# Patient Record
Sex: MALE | Race: ASIAN
[De-identification: names, ages, dates, MRNs, and addresses within clinical notes are randomized per-mention and may not be internally consistent; named-entity substitution may affect disease eponyms.]

---

## 2017-08-25 ENCOUNTER — APPOINTMENT (OUTPATIENT)
Dept: OTHER | Facility: CLINIC | Age: 57
End: 2017-08-25
Payer: COMMERCIAL

## 2017-08-25 ENCOUNTER — LABORATORY RESULT (OUTPATIENT)
Age: 57
End: 2017-08-25

## 2017-08-25 VITALS
DIASTOLIC BLOOD PRESSURE: 88 MMHG | HEIGHT: 68 IN | OXYGEN SATURATION: 99 % | BODY MASS INDEX: 25.76 KG/M2 | SYSTOLIC BLOOD PRESSURE: 142 MMHG | WEIGHT: 170 LBS | HEART RATE: 60 BPM

## 2017-08-25 PROCEDURE — 94010 BREATHING CAPACITY TEST: CPT

## 2017-08-25 PROCEDURE — 96150: CPT

## 2017-08-25 PROCEDURE — 99396 PREV VISIT EST AGE 40-64: CPT

## 2017-08-25 RX ORDER — METFORMIN HYDROCHLORIDE 1000 MG/1
1000 TABLET, COATED ORAL
Qty: 120 | Refills: 0 | Status: DISCONTINUED | COMMUNITY
Start: 2017-04-07 | End: 2017-08-25

## 2017-08-25 RX ORDER — LINAGLIPTIN 5 MG/1
5 TABLET, FILM COATED ORAL
Qty: 60 | Refills: 0 | Status: COMPLETED | COMMUNITY
Start: 2017-04-07

## 2017-08-28 LAB
ALBUMIN SERPL ELPH-MCNC: 4.4 G/DL
ALP BLD-CCNC: 65 U/L
ALT SERPL-CCNC: 57 U/L
ANION GAP SERPL CALC-SCNC: 16 MMOL/L
APPEARANCE: CLEAR
AST SERPL-CCNC: 34 U/L
BASOPHILS # BLD AUTO: NORMAL
BASOPHILS NFR BLD AUTO: NORMAL
BILIRUB SERPL-MCNC: 1 MG/DL
BILIRUBIN URINE: NEGATIVE
BLOOD URINE: NEGATIVE
BUN SERPL-MCNC: 21 MG/DL
CALCIUM SERPL-MCNC: 10.2 MG/DL
CHLORIDE SERPL-SCNC: 105 MMOL/L
CHOLEST SERPL-MCNC: 208 MG/DL
CHOLEST/HDLC SERPL: 3.9 RATIO
CO2 SERPL-SCNC: 22 MMOL/L
COLOR: YELLOW
CREAT SERPL-MCNC: 1.22 MG/DL
EOSINOPHIL # BLD AUTO: NORMAL
EOSINOPHIL NFR BLD AUTO: NORMAL
GLUCOSE QUALITATIVE U: NORMAL MG/DL
GLUCOSE SERPL-MCNC: 132 MG/DL
HCT VFR BLD CALC: NORMAL
HDLC SERPL-MCNC: 54 MG/DL
HGB BLD-MCNC: NORMAL
KETONES URINE: NEGATIVE
LDLC SERPL CALC-MCNC: 131 MG/DL
LEUKOCYTE ESTERASE URINE: NEGATIVE
LYMPHOCYTES # BLD AUTO: NORMAL
LYMPHOCYTES NFR BLD AUTO: NORMAL
MAN DIFF?: NORMAL
MCHC RBC-ENTMCNC: NORMAL
MCHC RBC-ENTMCNC: NORMAL
MCV RBC AUTO: NORMAL
MONOCYTES # BLD AUTO: NORMAL
MONOCYTES NFR BLD AUTO: NORMAL
NEUTROPHILS # BLD AUTO: NORMAL
NEUTROPHILS NFR BLD AUTO: NORMAL
NITRITE URINE: NEGATIVE
PH URINE: 6
PLATELET # BLD AUTO: NORMAL
POTASSIUM SERPL-SCNC: 4.8 MMOL/L
PROT SERPL-MCNC: 7.2 G/DL
PROTEIN URINE: NEGATIVE MG/DL
RBC # BLD: NORMAL
RBC # FLD: NORMAL
SODIUM SERPL-SCNC: 143 MMOL/L
SPECIFIC GRAVITY URINE: 1.02
TRIGL SERPL-MCNC: 114 MG/DL
UROBILINOGEN URINE: NORMAL MG/DL
WBC # FLD AUTO: NORMAL

## 2018-08-03 ENCOUNTER — APPOINTMENT (OUTPATIENT)
Dept: OTHER | Facility: CLINIC | Age: 58
End: 2018-08-03
Payer: COMMERCIAL

## 2018-08-03 ENCOUNTER — LABORATORY RESULT (OUTPATIENT)
Age: 58
End: 2018-08-03

## 2018-08-03 VITALS
HEIGHT: 68 IN | HEART RATE: 71 BPM | SYSTOLIC BLOOD PRESSURE: 144 MMHG | DIASTOLIC BLOOD PRESSURE: 91 MMHG | RESPIRATION RATE: 14 BRPM | OXYGEN SATURATION: 100 % | WEIGHT: 172 LBS | BODY MASS INDEX: 26.07 KG/M2

## 2018-08-03 PROCEDURE — 96150: CPT

## 2018-08-03 PROCEDURE — 94010 BREATHING CAPACITY TEST: CPT

## 2018-08-03 PROCEDURE — 99396 PREV VISIT EST AGE 40-64: CPT

## 2018-08-03 RX ORDER — SAXAGLIPTIN AND METFORMIN HYDROCHLORIDE 2.5; 1 MG/1; MG/1
2.5-1 TABLET, FILM COATED, EXTENDED RELEASE ORAL
Qty: 120 | Refills: 0 | Status: COMPLETED | COMMUNITY
Start: 2017-12-16

## 2018-08-06 LAB
ALBUMIN SERPL ELPH-MCNC: 4.9 G/DL
ALP BLD-CCNC: 67 U/L
ALT SERPL-CCNC: 58 U/L
ANION GAP SERPL CALC-SCNC: 15 MMOL/L
APPEARANCE: CLEAR
AST SERPL-CCNC: 29 U/L
BASOPHILS # BLD AUTO: 0.03 K/UL
BASOPHILS NFR BLD AUTO: 0.5 %
BILIRUB SERPL-MCNC: 0.8 MG/DL
BILIRUBIN URINE: NEGATIVE
BLOOD URINE: NEGATIVE
BUN SERPL-MCNC: 21 MG/DL
CALCIUM SERPL-MCNC: 9.5 MG/DL
CHLORIDE SERPL-SCNC: 102 MMOL/L
CHOLEST SERPL-MCNC: 177 MG/DL
CHOLEST/HDLC SERPL: 3.8 RATIO
CO2 SERPL-SCNC: 22 MMOL/L
COLOR: YELLOW
CREAT SERPL-MCNC: 1.16 MG/DL
EOSINOPHIL # BLD AUTO: 0.12 K/UL
EOSINOPHIL NFR BLD AUTO: 2 %
GLUCOSE QUALITATIVE U: NEGATIVE MG/DL
GLUCOSE SERPL-MCNC: 162 MG/DL
HCT VFR BLD CALC: 37 %
HDLC SERPL-MCNC: 47 MG/DL
HGB BLD-MCNC: 11.5 G/DL
IMM GRANULOCYTES NFR BLD AUTO: 0.3 %
KETONES URINE: NEGATIVE
LDLC SERPL CALC-MCNC: 108 MG/DL
LEUKOCYTE ESTERASE URINE: NEGATIVE
LYMPHOCYTES # BLD AUTO: 1.79 K/UL
LYMPHOCYTES NFR BLD AUTO: 29.4 %
MAN DIFF?: NORMAL
MCHC RBC-ENTMCNC: 19.3 PG
MCHC RBC-ENTMCNC: 31.1 GM/DL
MCV RBC AUTO: 62 FL
MONOCYTES # BLD AUTO: 0.25 K/UL
MONOCYTES NFR BLD AUTO: 4.1 %
NEUTROPHILS # BLD AUTO: 3.87 K/UL
NEUTROPHILS NFR BLD AUTO: 63.7 %
NITRITE URINE: NEGATIVE
PH URINE: 5.5
PLATELET # BLD AUTO: 213 K/UL
POTASSIUM SERPL-SCNC: 4.8 MMOL/L
PROT SERPL-MCNC: 7 G/DL
PROTEIN URINE: NEGATIVE MG/DL
RBC # BLD: 5.97 M/UL
RBC # FLD: 16.4 %
SODIUM SERPL-SCNC: 139 MMOL/L
SPECIFIC GRAVITY URINE: 1.01
TRIGL SERPL-MCNC: 111 MG/DL
UROBILINOGEN URINE: NEGATIVE MG/DL
WBC # FLD AUTO: 6.08 K/UL

## 2018-09-27 ENCOUNTER — APPOINTMENT (OUTPATIENT)
Dept: GASTROENTEROLOGY | Facility: CLINIC | Age: 58
End: 2018-09-27

## 2019-08-02 ENCOUNTER — LABORATORY RESULT (OUTPATIENT)
Age: 59
End: 2019-08-02

## 2019-08-02 ENCOUNTER — APPOINTMENT (OUTPATIENT)
Dept: OTHER | Facility: CLINIC | Age: 59
End: 2019-08-02
Payer: COMMERCIAL

## 2019-08-02 VITALS
HEIGHT: 68 IN | DIASTOLIC BLOOD PRESSURE: 75 MMHG | WEIGHT: 168 LBS | HEART RATE: 77 BPM | SYSTOLIC BLOOD PRESSURE: 117 MMHG | OXYGEN SATURATION: 98 % | RESPIRATION RATE: 16 BRPM | BODY MASS INDEX: 25.46 KG/M2

## 2019-08-02 PROCEDURE — 99396 PREV VISIT EST AGE 40-64: CPT | Mod: 25

## 2019-08-02 PROCEDURE — 94010 BREATHING CAPACITY TEST: CPT

## 2019-08-02 RX ORDER — SITAGLIPTIN AND METFORMIN HYDROCHLORIDE 50; 1000 MG/1; MG/1
50-1000 TABLET, FILM COATED ORAL
Qty: 180 | Refills: 0 | Status: COMPLETED | COMMUNITY
Start: 2019-01-21

## 2019-08-05 LAB
ALBUMIN SERPL ELPH-MCNC: 4.6 G/DL
ALP BLD-CCNC: 57 U/L
ALT SERPL-CCNC: 49 U/L
ANION GAP SERPL CALC-SCNC: 20 MMOL/L
APPEARANCE: CLEAR
AST SERPL-CCNC: 30 U/L
BACTERIA: NEGATIVE
BASOPHILS # BLD AUTO: 0.04 K/UL
BASOPHILS NFR BLD AUTO: 0.6 %
BILIRUB SERPL-MCNC: 1 MG/DL
BILIRUBIN URINE: NEGATIVE
BLOOD URINE: NEGATIVE
BUN SERPL-MCNC: 24 MG/DL
CALCIUM SERPL-MCNC: 9.5 MG/DL
CHLORIDE SERPL-SCNC: 106 MMOL/L
CHOLEST SERPL-MCNC: 168 MG/DL
CHOLEST/HDLC SERPL: 3.6 RATIO
CO2 SERPL-SCNC: 17 MMOL/L
COLOR: NORMAL
CREAT SERPL-MCNC: 1.54 MG/DL
EOSINOPHIL # BLD AUTO: 0.11 K/UL
EOSINOPHIL NFR BLD AUTO: 1.7 %
GLUCOSE QUALITATIVE U: NEGATIVE
GLUCOSE SERPL-MCNC: 124 MG/DL
HCT VFR BLD CALC: 36.2 %
HDLC SERPL-MCNC: 47 MG/DL
HGB BLD-MCNC: 11.1 G/DL
HYALINE CASTS: 0 /LPF
IMM GRANULOCYTES NFR BLD AUTO: 0.3 %
KETONES URINE: NEGATIVE
LDLC SERPL CALC-MCNC: 98 MG/DL
LEUKOCYTE ESTERASE URINE: NEGATIVE
LYMPHOCYTES # BLD AUTO: 1.22 K/UL
LYMPHOCYTES NFR BLD AUTO: 19.2 %
MAN DIFF?: NORMAL
MCHC RBC-ENTMCNC: 20 PG
MCHC RBC-ENTMCNC: 30.7 GM/DL
MCV RBC AUTO: 65.1 FL
MICROSCOPIC-UA: NORMAL
MONOCYTES # BLD AUTO: 0.39 K/UL
MONOCYTES NFR BLD AUTO: 6.2 %
NEUTROPHILS # BLD AUTO: 4.56 K/UL
NEUTROPHILS NFR BLD AUTO: 72 %
NITRITE URINE: NEGATIVE
PH URINE: 6
PLATELET # BLD AUTO: 217 K/UL
POTASSIUM SERPL-SCNC: 4.7 MMOL/L
PROT SERPL-MCNC: 6.9 G/DL
PROTEIN URINE: NEGATIVE
RBC # BLD: 5.56 M/UL
RBC # FLD: 18 %
RED BLOOD CELLS URINE: 5 /HPF
SODIUM SERPL-SCNC: 143 MMOL/L
SPECIFIC GRAVITY URINE: 1.01
SQUAMOUS EPITHELIAL CELLS: 0 /HPF
TRIGL SERPL-MCNC: 114 MG/DL
UROBILINOGEN URINE: NORMAL
WBC # FLD AUTO: 6.34 K/UL
WHITE BLOOD CELLS URINE: 1 /HPF

## 2020-07-31 ENCOUNTER — APPOINTMENT (OUTPATIENT)
Dept: OTHER | Facility: CLINIC | Age: 60
End: 2020-07-31
Payer: COMMERCIAL

## 2020-07-31 PROCEDURE — 99396 PREV VISIT EST AGE 40-64: CPT | Mod: 95

## 2021-09-17 ENCOUNTER — APPOINTMENT (OUTPATIENT)
Dept: OTHER | Facility: CLINIC | Age: 61
End: 2021-09-17
Payer: COMMERCIAL

## 2021-09-17 ENCOUNTER — LABORATORY RESULT (OUTPATIENT)
Age: 61
End: 2021-09-17

## 2021-09-17 VITALS
DIASTOLIC BLOOD PRESSURE: 86 MMHG | HEIGHT: 68 IN | WEIGHT: 165 LBS | SYSTOLIC BLOOD PRESSURE: 147 MMHG | TEMPERATURE: 98.6 F | BODY MASS INDEX: 25.01 KG/M2 | RESPIRATION RATE: 15 BRPM | HEART RATE: 66 BPM | OXYGEN SATURATION: 98 %

## 2021-09-17 DIAGNOSIS — Z23 ENCOUNTER FOR IMMUNIZATION: ICD-10-CM

## 2021-09-17 PROCEDURE — 90686 IIV4 VACC NO PRSV 0.5 ML IM: CPT

## 2021-09-17 PROCEDURE — 99396 PREV VISIT EST AGE 40-64: CPT | Mod: 25

## 2021-09-17 PROCEDURE — G0008: CPT

## 2021-09-17 RX ORDER — AMLODIPINE BESYLATE 10 MG/1
10 TABLET ORAL
Qty: 90 | Refills: 0 | Status: COMPLETED | COMMUNITY
Start: 2018-05-31 | End: 2021-09-17

## 2021-09-21 LAB
ALBUMIN SERPL ELPH-MCNC: 4.3 G/DL
ALP BLD-CCNC: 70 U/L
ALT SERPL-CCNC: 42 U/L
ANION GAP SERPL CALC-SCNC: 15 MMOL/L
APPEARANCE: CLEAR
AST SERPL-CCNC: 23 U/L
BACTERIA: NEGATIVE
BASOPHILS # BLD AUTO: 0.11 K/UL
BASOPHILS NFR BLD AUTO: 1.7 %
BILIRUB SERPL-MCNC: 0.6 MG/DL
BILIRUBIN URINE: NEGATIVE
BLOOD URINE: NEGATIVE
BUN SERPL-MCNC: 23 MG/DL
CALCIUM SERPL-MCNC: 10 MG/DL
CHLORIDE SERPL-SCNC: 102 MMOL/L
CHOLEST SERPL-MCNC: 181 MG/DL
CO2 SERPL-SCNC: 22 MMOL/L
COLOR: NORMAL
CREAT SERPL-MCNC: 1.03 MG/DL
EOSINOPHIL # BLD AUTO: 0.16 K/UL
EOSINOPHIL NFR BLD AUTO: 2.6 %
GLUCOSE QUALITATIVE U: ABNORMAL
GLUCOSE SERPL-MCNC: 236 MG/DL
HCT VFR BLD CALC: 42.4 %
HDLC SERPL-MCNC: 43 MG/DL
HGB BLD-MCNC: 12.4 G/DL
HYALINE CASTS: 1 /LPF
KETONES URINE: NEGATIVE
LDLC SERPL CALC-MCNC: 99 MG/DL
LEUKOCYTE ESTERASE URINE: NEGATIVE
LYMPHOCYTES # BLD AUTO: 1.86 K/UL
LYMPHOCYTES NFR BLD AUTO: 29.9 %
MAN DIFF?: NORMAL
MCHC RBC-ENTMCNC: 19.9 PG
MCHC RBC-ENTMCNC: 29.2 GM/DL
MCV RBC AUTO: 68.1 FL
MICROSCOPIC-UA: NORMAL
MONOCYTES # BLD AUTO: 0.21 K/UL
MONOCYTES NFR BLD AUTO: 3.4 %
NEUTROPHILS # BLD AUTO: 3.83 K/UL
NEUTROPHILS NFR BLD AUTO: 60.7 %
NITRITE URINE: NEGATIVE
NONHDLC SERPL-MCNC: 138 MG/DL
PH URINE: 6
PLATELET # BLD AUTO: 221 K/UL
POTASSIUM SERPL-SCNC: 4.9 MMOL/L
PROT SERPL-MCNC: 7 G/DL
PROTEIN URINE: NORMAL
RBC # BLD: 6.23 M/UL
RBC # FLD: 18.6 %
RED BLOOD CELLS URINE: 7 /HPF
SODIUM SERPL-SCNC: 139 MMOL/L
SPECIFIC GRAVITY URINE: 1.02
SQUAMOUS EPITHELIAL CELLS: 0 /HPF
TRIGL SERPL-MCNC: 197 MG/DL
UROBILINOGEN URINE: NORMAL
WBC # FLD AUTO: 6.22 K/UL
WHITE BLOOD CELLS URINE: 1 /HPF

## 2022-10-06 ENCOUNTER — APPOINTMENT (OUTPATIENT)
Dept: OTHER | Facility: CLINIC | Age: 62
End: 2022-10-06

## 2022-10-06 ENCOUNTER — LABORATORY RESULT (OUTPATIENT)
Age: 62
End: 2022-10-06

## 2022-10-06 VITALS
DIASTOLIC BLOOD PRESSURE: 80 MMHG | TEMPERATURE: 97.4 F | WEIGHT: 170 LBS | HEIGHT: 68 IN | BODY MASS INDEX: 25.76 KG/M2 | SYSTOLIC BLOOD PRESSURE: 121 MMHG | OXYGEN SATURATION: 98 % | HEART RATE: 65 BPM

## 2022-10-06 DIAGNOSIS — Z12.9 ENCOUNTER FOR SCREENING FOR MALIGNANT NEOPLASM, SITE UNSPECIFIED: ICD-10-CM

## 2022-10-06 DIAGNOSIS — Z04.9 ENCOUNTER FOR EXAMINATION AND OBSERVATION FOR UNSPECIFIED REASON: ICD-10-CM

## 2022-10-06 PROCEDURE — 94010 BREATHING CAPACITY TEST: CPT

## 2022-10-06 PROCEDURE — G0008: CPT

## 2022-10-06 PROCEDURE — 99396 PREV VISIT EST AGE 40-64: CPT | Mod: 25

## 2022-10-06 PROCEDURE — 90686 IIV4 VACC NO PRSV 0.5 ML IM: CPT

## 2022-10-10 LAB
ALBUMIN SERPL ELPH-MCNC: 4.8 G/DL
ALP BLD-CCNC: 70 U/L
ALT SERPL-CCNC: 47 U/L
ANION GAP SERPL CALC-SCNC: 18 MMOL/L
APPEARANCE: CLEAR
AST SERPL-CCNC: 32 U/L
BACTERIA: NEGATIVE
BASOPHILS # BLD AUTO: 0.06 K/UL
BASOPHILS NFR BLD AUTO: 0.9 %
BILIRUB SERPL-MCNC: 0.9 MG/DL
BILIRUBIN URINE: NEGATIVE
BLOOD URINE: NEGATIVE
BUN SERPL-MCNC: 33 MG/DL
CALCIUM SERPL-MCNC: 10 MG/DL
CHLORIDE SERPL-SCNC: 98 MMOL/L
CHOLEST SERPL-MCNC: 168 MG/DL
CO2 SERPL-SCNC: 22 MMOL/L
COLOR: NORMAL
CREAT SERPL-MCNC: 1.38 MG/DL
EGFR: 59 ML/MIN/1.73M2
EOSINOPHIL # BLD AUTO: 0.11 K/UL
EOSINOPHIL NFR BLD AUTO: 1.7 %
GLUCOSE QUALITATIVE U: ABNORMAL
GLUCOSE SERPL-MCNC: 278 MG/DL
HCT VFR BLD CALC: 45 %
HDLC SERPL-MCNC: 38 MG/DL
HGB BLD-MCNC: 13.2 G/DL
HYALINE CASTS: 3 /LPF
IMM GRANULOCYTES NFR BLD AUTO: 0.2 %
KETONES URINE: NEGATIVE
LDLC SERPL CALC-MCNC: 93 MG/DL
LEUKOCYTE ESTERASE URINE: NEGATIVE
LYMPHOCYTES # BLD AUTO: 1.45 K/UL
LYMPHOCYTES NFR BLD AUTO: 22.6 %
MAN DIFF?: NORMAL
MCHC RBC-ENTMCNC: 19.6 PG
MCHC RBC-ENTMCNC: 29.3 GM/DL
MCV RBC AUTO: 67 FL
MICROSCOPIC-UA: NORMAL
MONOCYTES # BLD AUTO: 0.27 K/UL
MONOCYTES NFR BLD AUTO: 4.2 %
NEUTROPHILS # BLD AUTO: 4.53 K/UL
NEUTROPHILS NFR BLD AUTO: 70.4 %
NITRITE URINE: NEGATIVE
NONHDLC SERPL-MCNC: 130 MG/DL
PH URINE: 5.5
PLATELET # BLD AUTO: 219 K/UL
POTASSIUM SERPL-SCNC: 4.4 MMOL/L
PROT SERPL-MCNC: 7.3 G/DL
PROTEIN URINE: NEGATIVE
RBC # BLD: 6.72 M/UL
RBC # FLD: 19.7 %
RED BLOOD CELLS URINE: 3 /HPF
SODIUM SERPL-SCNC: 138 MMOL/L
SPECIFIC GRAVITY URINE: 1.02
SQUAMOUS EPITHELIAL CELLS: 0 /HPF
TRIGL SERPL-MCNC: 187 MG/DL
UROBILINOGEN URINE: NORMAL
WBC # FLD AUTO: 6.43 K/UL
WHITE BLOOD CELLS URINE: 1 /HPF

## 2022-10-28 ENCOUNTER — APPOINTMENT (OUTPATIENT)
Dept: UROLOGY | Facility: CLINIC | Age: 62
End: 2022-10-28

## 2022-10-28 VITALS
TEMPERATURE: 97.4 F | HEART RATE: 59 BPM | DIASTOLIC BLOOD PRESSURE: 77 MMHG | OXYGEN SATURATION: 99 % | WEIGHT: 165 LBS | SYSTOLIC BLOOD PRESSURE: 120 MMHG | HEIGHT: 68 IN | BODY MASS INDEX: 25.01 KG/M2

## 2022-10-28 DIAGNOSIS — R39.13 SPLITTING OF URINARY STREAM: ICD-10-CM

## 2022-10-28 DIAGNOSIS — Z78.9 OTHER SPECIFIED HEALTH STATUS: ICD-10-CM

## 2022-10-28 PROCEDURE — 99204 OFFICE O/P NEW MOD 45 MIN: CPT

## 2022-10-28 NOTE — ASSESSMENT
[FreeTextEntry1] : Mr. Duran is a very pleasant 62 year old man here today for microscopic hematuria.\par Urinalysis most recently demonstrates 3 red blood cells per high-powered field, however demonstrated up to 7 red blood cells per high-powered field in September 2021\par PSA from 07/2022 0.4.\par UC today\par CT Urogram.\par RTO cystoscopy 3 weeks.\par -We discussed AUA guidelines regarding risk stratification for microscopic hematuria\par -Extensive discussion of the potential etiologies of hematuria, as well as the need to complete full work up for evaluation of cancer or other  sources of hematuria.  Patient understands and wishes to proceed.

## 2022-10-28 NOTE — HISTORY OF PRESENT ILLNESS
[Currently Experiencing ___] :  [unfilled] [Intermittency] : intermittency [Hematuria - Microscopic] : microscopic hematuria [None] : None [FreeTextEntry1] : Mr. Duran is a very pleasant 62 year old man here today for microscopic hematuria.\par He was referred by Dr. Melanie Reyes.\par He has a history of microscopic hematuria, UA from 10/06/22 3 RBCs, previous in 2021 7 RBCs.\par Previous in 2019 5 RBCs.\par He denies any gross hematuria, dysuria or urinary retention.\par Reports occasional back pain upon waking.\par Reports intermittent episodes of interrupted stream that is not so bothersome.\par Nocturia x1 that sometimes interrupts sleep habits.\par Denies any personal or family history of kidney stones.\par Denies any family history of urological cancers.\par Former smoker, quit over 40 years ago.

## 2022-10-31 LAB — BACTERIA UR CULT: NORMAL

## 2022-11-22 ENCOUNTER — APPOINTMENT (OUTPATIENT)
Dept: UROLOGY | Facility: CLINIC | Age: 62
End: 2022-11-22

## 2022-12-21 ENCOUNTER — APPOINTMENT (OUTPATIENT)
Dept: UROLOGY | Facility: CLINIC | Age: 62
End: 2022-12-21

## 2022-12-21 VITALS
HEIGHT: 68 IN | DIASTOLIC BLOOD PRESSURE: 80 MMHG | OXYGEN SATURATION: 98 % | TEMPERATURE: 98 F | HEART RATE: 72 BPM | BODY MASS INDEX: 25.16 KG/M2 | WEIGHT: 166 LBS | SYSTOLIC BLOOD PRESSURE: 127 MMHG

## 2022-12-21 DIAGNOSIS — R31.29 OTHER MICROSCOPIC HEMATURIA: ICD-10-CM

## 2022-12-21 PROCEDURE — 99213 OFFICE O/P EST LOW 20 MIN: CPT | Mod: 25

## 2022-12-21 PROCEDURE — 52000 CYSTOURETHROSCOPY: CPT

## 2022-12-21 NOTE — HISTORY OF PRESENT ILLNESS
[FreeTextEntry1] : Very pleasant 62-year-old gentleman who presents for follow-up of microscopic hematuria.  He was recently found to have 3 red blood cells per high-powered field on urinalysis from 10/2022.  This was noted to be as high as 7 in 2021.  He denies a history of gross hematuria.  No flank pain or suprapubic pain.  He underwent a CT scan at [facility] in November 2022 which demonstrated no kidney stones, hydronephrosis, or renal masses.  He underwent a cystoscopy today which demonstrated no urothelial lesions.

## 2022-12-21 NOTE — ASSESSMENT
[FreeTextEntry1] : Very pleasant 62-year-old gentleman who presents for follow-up of microscopic hematuria\par -CT images from [facility] radiology reviewed demonstrating no kidney stones, hydronephrosis, renal masses\par -Cystoscopy today negative\par -PSA from 7/2022 was 0.4\par -Creatinine 1.38 from 10/2022\par -Urinalysis most recently demonstrates 3 red blood cells per high-powered field\par -Follow-up in 6 months with urine studies

## 2022-12-26 ENCOUNTER — FORM ENCOUNTER (OUTPATIENT)
Age: 62
End: 2022-12-26

## 2022-12-27 ENCOUNTER — INPATIENT (INPATIENT)
Facility: HOSPITAL | Age: 62
LOS: 3 days | Discharge: HOME | End: 2022-12-31
Attending: HOSPITALIST | Admitting: HOSPITALIST
Payer: COMMERCIAL

## 2022-12-27 VITALS
RESPIRATION RATE: 16 BRPM | OXYGEN SATURATION: 100 % | HEART RATE: 99 BPM | SYSTOLIC BLOOD PRESSURE: 81 MMHG | TEMPERATURE: 100 F | DIASTOLIC BLOOD PRESSURE: 59 MMHG

## 2022-12-27 DIAGNOSIS — I10 ESSENTIAL (PRIMARY) HYPERTENSION: ICD-10-CM

## 2022-12-27 DIAGNOSIS — M10.9 GOUT, UNSPECIFIED: ICD-10-CM

## 2022-12-27 DIAGNOSIS — D64.9 ANEMIA, UNSPECIFIED: ICD-10-CM

## 2022-12-27 DIAGNOSIS — N39.0 URINARY TRACT INFECTION, SITE NOT SPECIFIED: ICD-10-CM

## 2022-12-27 DIAGNOSIS — E11.10 TYPE 2 DIABETES MELLITUS WITH KETOACIDOSIS WITHOUT COMA: ICD-10-CM

## 2022-12-27 DIAGNOSIS — E11.65 TYPE 2 DIABETES MELLITUS WITH HYPERGLYCEMIA: ICD-10-CM

## 2022-12-27 DIAGNOSIS — A41.9 SEPSIS, UNSPECIFIED ORGANISM: ICD-10-CM

## 2022-12-27 DIAGNOSIS — Z29.9 ENCOUNTER FOR PROPHYLACTIC MEASURES, UNSPECIFIED: ICD-10-CM

## 2022-12-27 DIAGNOSIS — D69.6 THROMBOCYTOPENIA, UNSPECIFIED: ICD-10-CM

## 2022-12-27 DIAGNOSIS — E78.5 HYPERLIPIDEMIA, UNSPECIFIED: ICD-10-CM

## 2022-12-27 DIAGNOSIS — E87.1 HYPO-OSMOLALITY AND HYPONATREMIA: ICD-10-CM

## 2022-12-27 DIAGNOSIS — N17.9 ACUTE KIDNEY FAILURE, UNSPECIFIED: ICD-10-CM

## 2022-12-27 DIAGNOSIS — E11.9 TYPE 2 DIABETES MELLITUS WITHOUT COMPLICATIONS: ICD-10-CM

## 2022-12-27 LAB
ALBUMIN SERPL ELPH-MCNC: 2.9 G/DL — LOW (ref 3.3–5)
ALP SERPL-CCNC: 90 U/L — SIGNIFICANT CHANGE UP (ref 40–120)
ALT FLD-CCNC: 19 U/L — SIGNIFICANT CHANGE UP (ref 4–41)
ANION GAP SERPL CALC-SCNC: 12 MMOL/L — SIGNIFICANT CHANGE UP (ref 7–14)
ANION GAP SERPL CALC-SCNC: 15 MMOL/L — HIGH (ref 7–14)
ANION GAP SERPL CALC-SCNC: 16 MMOL/L — HIGH (ref 7–14)
ANION GAP SERPL CALC-SCNC: 18 MMOL/L — HIGH (ref 7–14)
ANISOCYTOSIS BLD QL: SIGNIFICANT CHANGE UP
APPEARANCE UR: CLEAR — SIGNIFICANT CHANGE UP
APTT BLD: 28.9 SEC — SIGNIFICANT CHANGE UP (ref 27–36.3)
AST SERPL-CCNC: 19 U/L — SIGNIFICANT CHANGE UP (ref 4–40)
B PERT DNA SPEC QL NAA+PROBE: SIGNIFICANT CHANGE UP
B PERT+PARAPERT DNA PNL SPEC NAA+PROBE: SIGNIFICANT CHANGE UP
B-OH-BUTYR SERPL-SCNC: 1.6 MMOL/L — HIGH (ref 0–0.4)
B-OH-BUTYR SERPL-SCNC: 2.2 MMOL/L — HIGH (ref 0–0.4)
BACTERIA # UR AUTO: ABNORMAL
BASE EXCESS BLDV CALC-SCNC: -2.5 MMOL/L — LOW (ref -2–3)
BASE EXCESS BLDV CALC-SCNC: -6.7 MMOL/L — LOW (ref -2–3)
BASOPHILS # BLD AUTO: 0 K/UL — SIGNIFICANT CHANGE UP (ref 0–0.2)
BASOPHILS NFR BLD AUTO: 0 % — SIGNIFICANT CHANGE UP (ref 0–2)
BILIRUB SERPL-MCNC: 1.1 MG/DL — SIGNIFICANT CHANGE UP (ref 0.2–1.2)
BILIRUB UR-MCNC: NEGATIVE — SIGNIFICANT CHANGE UP
BLOOD GAS VENOUS COMPREHENSIVE RESULT: SIGNIFICANT CHANGE UP
BLOOD GAS VENOUS COMPREHENSIVE RESULT: SIGNIFICANT CHANGE UP
BORDETELLA PARAPERTUSSIS (RAPRVP): SIGNIFICANT CHANGE UP
BUN SERPL-MCNC: 32 MG/DL — HIGH (ref 7–23)
BUN SERPL-MCNC: 34 MG/DL — HIGH (ref 7–23)
BUN SERPL-MCNC: 41 MG/DL — HIGH (ref 7–23)
BUN SERPL-MCNC: 46 MG/DL — HIGH (ref 7–23)
C PNEUM DNA SPEC QL NAA+PROBE: SIGNIFICANT CHANGE UP
CALCIUM SERPL-MCNC: 8.1 MG/DL — LOW (ref 8.4–10.5)
CALCIUM SERPL-MCNC: 8.1 MG/DL — LOW (ref 8.4–10.5)
CALCIUM SERPL-MCNC: 8.5 MG/DL — SIGNIFICANT CHANGE UP (ref 8.4–10.5)
CALCIUM SERPL-MCNC: 8.6 MG/DL — SIGNIFICANT CHANGE UP (ref 8.4–10.5)
CHLORIDE BLDV-SCNC: 90 MMOL/L — LOW (ref 96–108)
CHLORIDE BLDV-SCNC: 99 MMOL/L — SIGNIFICANT CHANGE UP (ref 96–108)
CHLORIDE SERPL-SCNC: 87 MMOL/L — LOW (ref 98–107)
CHLORIDE SERPL-SCNC: 93 MMOL/L — LOW (ref 98–107)
CHLORIDE SERPL-SCNC: 96 MMOL/L — LOW (ref 98–107)
CHLORIDE SERPL-SCNC: 98 MMOL/L — SIGNIFICANT CHANGE UP (ref 98–107)
CO2 BLDV-SCNC: 19.3 MMOL/L — LOW (ref 22–26)
CO2 BLDV-SCNC: 22.4 MMOL/L — SIGNIFICANT CHANGE UP (ref 22–26)
CO2 SERPL-SCNC: 17 MMOL/L — LOW (ref 22–31)
CO2 SERPL-SCNC: 18 MMOL/L — LOW (ref 22–31)
CO2 SERPL-SCNC: 19 MMOL/L — LOW (ref 22–31)
CO2 SERPL-SCNC: 21 MMOL/L — LOW (ref 22–31)
COLOR SPEC: SIGNIFICANT CHANGE UP
CREAT SERPL-MCNC: 1.5 MG/DL — HIGH (ref 0.5–1.3)
CREAT SERPL-MCNC: 1.6 MG/DL — HIGH (ref 0.5–1.3)
CREAT SERPL-MCNC: 1.95 MG/DL — HIGH (ref 0.5–1.3)
CREAT SERPL-MCNC: 2.16 MG/DL — HIGH (ref 0.5–1.3)
DACRYOCYTES BLD QL SMEAR: SLIGHT — SIGNIFICANT CHANGE UP
DIFF PNL FLD: ABNORMAL
EGFR: 34 ML/MIN/1.73M2 — LOW
EGFR: 39 ML/MIN/1.73M2 — LOW
EGFR: 49 ML/MIN/1.73M2 — LOW
EGFR: 53 ML/MIN/1.73M2 — LOW
ELLIPTOCYTES BLD QL SMEAR: SLIGHT — SIGNIFICANT CHANGE UP
EOSINOPHIL # BLD AUTO: 0 K/UL — SIGNIFICANT CHANGE UP (ref 0–0.5)
EOSINOPHIL NFR BLD AUTO: 0 % — SIGNIFICANT CHANGE UP (ref 0–6)
EPI CELLS # UR: 2 /HPF — SIGNIFICANT CHANGE UP (ref 0–5)
FLUAV SUBTYP SPEC NAA+PROBE: SIGNIFICANT CHANGE UP
FLUBV RNA SPEC QL NAA+PROBE: SIGNIFICANT CHANGE UP
GAS PNL BLDV: 120 MMOL/L — CRITICAL LOW (ref 136–145)
GAS PNL BLDV: 128 MMOL/L — LOW (ref 136–145)
GAS PNL BLDV: SIGNIFICANT CHANGE UP
GIANT PLATELETS BLD QL SMEAR: PRESENT — SIGNIFICANT CHANGE UP
GLUCOSE BLDC GLUCOMTR-MCNC: 214 MG/DL — HIGH (ref 70–99)
GLUCOSE BLDC GLUCOMTR-MCNC: 249 MG/DL — HIGH (ref 70–99)
GLUCOSE BLDC GLUCOMTR-MCNC: 275 MG/DL — HIGH (ref 70–99)
GLUCOSE BLDC GLUCOMTR-MCNC: 276 MG/DL — HIGH (ref 70–99)
GLUCOSE BLDC GLUCOMTR-MCNC: 284 MG/DL — HIGH (ref 70–99)
GLUCOSE BLDC GLUCOMTR-MCNC: 286 MG/DL — HIGH (ref 70–99)
GLUCOSE BLDV-MCNC: 217 MG/DL — HIGH (ref 70–99)
GLUCOSE BLDV-MCNC: 375 MG/DL — HIGH (ref 70–99)
GLUCOSE SERPL-MCNC: 218 MG/DL — HIGH (ref 70–99)
GLUCOSE SERPL-MCNC: 280 MG/DL — HIGH (ref 70–99)
GLUCOSE SERPL-MCNC: 320 MG/DL — HIGH (ref 70–99)
GLUCOSE SERPL-MCNC: 351 MG/DL — HIGH (ref 70–99)
GLUCOSE UR QL: ABNORMAL
HADV DNA SPEC QL NAA+PROBE: SIGNIFICANT CHANGE UP
HCO3 BLDV-SCNC: 18 MMOL/L — LOW (ref 22–29)
HCO3 BLDV-SCNC: 21 MMOL/L — LOW (ref 22–29)
HCOV 229E RNA SPEC QL NAA+PROBE: SIGNIFICANT CHANGE UP
HCOV HKU1 RNA SPEC QL NAA+PROBE: SIGNIFICANT CHANGE UP
HCOV NL63 RNA SPEC QL NAA+PROBE: SIGNIFICANT CHANGE UP
HCOV OC43 RNA SPEC QL NAA+PROBE: SIGNIFICANT CHANGE UP
HCT VFR BLD CALC: 32.5 % — LOW (ref 39–50)
HCT VFR BLDA CALC: 32 % — LOW (ref 39–51)
HCT VFR BLDA CALC: 32 % — LOW (ref 39–51)
HGB BLD CALC-MCNC: 10.6 G/DL — LOW (ref 13–17)
HGB BLD CALC-MCNC: 10.7 G/DL — LOW (ref 13–17)
HGB BLD-MCNC: 10.2 G/DL — LOW (ref 13–17)
HMPV RNA SPEC QL NAA+PROBE: SIGNIFICANT CHANGE UP
HPIV1 RNA SPEC QL NAA+PROBE: SIGNIFICANT CHANGE UP
HPIV2 RNA SPEC QL NAA+PROBE: SIGNIFICANT CHANGE UP
HPIV3 RNA SPEC QL NAA+PROBE: SIGNIFICANT CHANGE UP
HPIV4 RNA SPEC QL NAA+PROBE: SIGNIFICANT CHANGE UP
HYALINE CASTS # UR AUTO: 1 /LPF — SIGNIFICANT CHANGE UP (ref 0–7)
HYPOCHROMIA BLD QL: SLIGHT — SIGNIFICANT CHANGE UP
IANC: 5.25 K/UL — SIGNIFICANT CHANGE UP (ref 1.8–7.4)
INR BLD: 1.09 RATIO — SIGNIFICANT CHANGE UP (ref 0.88–1.16)
KETONES UR-MCNC: ABNORMAL
LACTATE BLDV-MCNC: 1.3 MMOL/L — SIGNIFICANT CHANGE UP (ref 0.5–2)
LACTATE BLDV-MCNC: 2.7 MMOL/L — HIGH (ref 0.5–2)
LEUKOCYTE ESTERASE UR-ACNC: ABNORMAL
LYMPHOCYTES # BLD AUTO: 0 % — LOW (ref 13–44)
LYMPHOCYTES # BLD AUTO: 0 K/UL — LOW (ref 1–3.3)
M PNEUMO DNA SPEC QL NAA+PROBE: SIGNIFICANT CHANGE UP
MAGNESIUM SERPL-MCNC: 1.4 MG/DL — LOW (ref 1.6–2.6)
MAGNESIUM SERPL-MCNC: 1.8 MG/DL — SIGNIFICANT CHANGE UP (ref 1.6–2.6)
MAGNESIUM SERPL-MCNC: 1.8 MG/DL — SIGNIFICANT CHANGE UP (ref 1.6–2.6)
MANUAL SMEAR VERIFICATION: SIGNIFICANT CHANGE UP
MCHC RBC-ENTMCNC: 19 PG — LOW (ref 27–34)
MCHC RBC-ENTMCNC: 31.4 GM/DL — LOW (ref 32–36)
MCV RBC AUTO: 60.4 FL — LOW (ref 80–100)
MICROCYTES BLD QL: SIGNIFICANT CHANGE UP
MONOCYTES # BLD AUTO: 0.1 K/UL — SIGNIFICANT CHANGE UP (ref 0–0.9)
MONOCYTES NFR BLD AUTO: 1.7 % — LOW (ref 2–14)
NEUTROPHILS # BLD AUTO: 5.52 K/UL — SIGNIFICANT CHANGE UP (ref 1.8–7.4)
NEUTROPHILS NFR BLD AUTO: 87 % — HIGH (ref 43–77)
NEUTS BAND # BLD: 10.4 % — CRITICAL HIGH (ref 0–6)
NITRITE UR-MCNC: NEGATIVE — SIGNIFICANT CHANGE UP
OSMOLALITY SERPL: 287 MOSM/KG — SIGNIFICANT CHANGE UP (ref 275–295)
OVALOCYTES BLD QL SMEAR: SLIGHT — SIGNIFICANT CHANGE UP
PCO2 BLDV: 33 MMHG — LOW (ref 42–55)
PCO2 BLDV: 34 MMHG — LOW (ref 42–55)
PH BLDV: 7.34 — SIGNIFICANT CHANGE UP (ref 7.32–7.43)
PH BLDV: 7.42 — SIGNIFICANT CHANGE UP (ref 7.32–7.43)
PH UR: 6 — SIGNIFICANT CHANGE UP (ref 5–8)
PHOSPHATE SERPL-MCNC: 1.6 MG/DL — LOW (ref 2.5–4.5)
PHOSPHATE SERPL-MCNC: 2.1 MG/DL — LOW (ref 2.5–4.5)
PHOSPHATE SERPL-MCNC: 2.4 MG/DL — LOW (ref 2.5–4.5)
PLAT MORPH BLD: NORMAL — SIGNIFICANT CHANGE UP
PLATELET # BLD AUTO: 133 K/UL — LOW (ref 150–400)
PLATELET COUNT - ESTIMATE: ABNORMAL
PO2 BLDV: 45 MMHG — SIGNIFICANT CHANGE UP
PO2 BLDV: 60 MMHG — SIGNIFICANT CHANGE UP
POIKILOCYTOSIS BLD QL AUTO: SIGNIFICANT CHANGE UP
POTASSIUM BLDV-SCNC: 3.6 MMOL/L — SIGNIFICANT CHANGE UP (ref 3.5–5.1)
POTASSIUM BLDV-SCNC: 4.1 MMOL/L — SIGNIFICANT CHANGE UP (ref 3.5–5.1)
POTASSIUM SERPL-MCNC: 3.6 MMOL/L — SIGNIFICANT CHANGE UP (ref 3.5–5.3)
POTASSIUM SERPL-MCNC: 3.8 MMOL/L — SIGNIFICANT CHANGE UP (ref 3.5–5.3)
POTASSIUM SERPL-MCNC: 4.1 MMOL/L — SIGNIFICANT CHANGE UP (ref 3.5–5.3)
POTASSIUM SERPL-MCNC: 4.1 MMOL/L — SIGNIFICANT CHANGE UP (ref 3.5–5.3)
POTASSIUM SERPL-SCNC: 3.6 MMOL/L — SIGNIFICANT CHANGE UP (ref 3.5–5.3)
POTASSIUM SERPL-SCNC: 3.8 MMOL/L — SIGNIFICANT CHANGE UP (ref 3.5–5.3)
POTASSIUM SERPL-SCNC: 4.1 MMOL/L — SIGNIFICANT CHANGE UP (ref 3.5–5.3)
POTASSIUM SERPL-SCNC: 4.1 MMOL/L — SIGNIFICANT CHANGE UP (ref 3.5–5.3)
PROT SERPL-MCNC: 6 G/DL — SIGNIFICANT CHANGE UP (ref 6–8.3)
PROT UR-MCNC: ABNORMAL
PROTHROM AB SERPL-ACNC: 12.7 SEC — SIGNIFICANT CHANGE UP (ref 10.5–13.4)
RAPID RVP RESULT: SIGNIFICANT CHANGE UP
RBC # BLD: 5.38 M/UL — SIGNIFICANT CHANGE UP (ref 4.2–5.8)
RBC # FLD: 15.8 % — HIGH (ref 10.3–14.5)
RBC BLD AUTO: ABNORMAL
RBC CASTS # UR COMP ASSIST: 3 /HPF — SIGNIFICANT CHANGE UP (ref 0–4)
RSV RNA SPEC QL NAA+PROBE: SIGNIFICANT CHANGE UP
RV+EV RNA SPEC QL NAA+PROBE: SIGNIFICANT CHANGE UP
SAO2 % BLDV: 70.3 % — SIGNIFICANT CHANGE UP
SAO2 % BLDV: 87.1 % — SIGNIFICANT CHANGE UP
SARS-COV-2 RNA SPEC QL NAA+PROBE: SIGNIFICANT CHANGE UP
SCHISTOCYTES BLD QL AUTO: SLIGHT — SIGNIFICANT CHANGE UP
SODIUM SERPL-SCNC: 122 MMOL/L — LOW (ref 135–145)
SODIUM SERPL-SCNC: 127 MMOL/L — LOW (ref 135–145)
SODIUM SERPL-SCNC: 129 MMOL/L — LOW (ref 135–145)
SODIUM SERPL-SCNC: 132 MMOL/L — LOW (ref 135–145)
SP GR SPEC: 1.01 — SIGNIFICANT CHANGE UP (ref 1.01–1.05)
UROBILINOGEN FLD QL: SIGNIFICANT CHANGE UP
VARIANT LYMPHS # BLD: 0.9 % — SIGNIFICANT CHANGE UP (ref 0–6)
WBC # BLD: 5.67 K/UL — SIGNIFICANT CHANGE UP (ref 3.8–10.5)
WBC # FLD AUTO: 5.67 K/UL — SIGNIFICANT CHANGE UP (ref 3.8–10.5)
WBC UR QL: 13 /HPF — HIGH (ref 0–5)

## 2022-12-27 PROCEDURE — 99285 EMERGENCY DEPT VISIT HI MDM: CPT

## 2022-12-27 PROCEDURE — 99223 1ST HOSP IP/OBS HIGH 75: CPT

## 2022-12-27 PROCEDURE — 99233 SBSQ HOSP IP/OBS HIGH 50: CPT | Mod: GC

## 2022-12-27 PROCEDURE — 99223 1ST HOSP IP/OBS HIGH 75: CPT | Mod: GC

## 2022-12-27 PROCEDURE — 99233 SBSQ HOSP IP/OBS HIGH 50: CPT

## 2022-12-27 PROCEDURE — 71045 X-RAY EXAM CHEST 1 VIEW: CPT | Mod: 26

## 2022-12-27 RX ORDER — SODIUM CHLORIDE 9 MG/ML
1000 INJECTION, SOLUTION INTRAVENOUS
Refills: 0 | Status: DISCONTINUED | OUTPATIENT
Start: 2022-12-27 | End: 2022-12-27

## 2022-12-27 RX ORDER — INSULIN LISPRO 100/ML
VIAL (ML) SUBCUTANEOUS EVERY 4 HOURS
Refills: 0 | Status: DISCONTINUED | OUTPATIENT
Start: 2022-12-27 | End: 2022-12-27

## 2022-12-27 RX ORDER — CEFTRIAXONE 500 MG/1
1000 INJECTION, POWDER, FOR SOLUTION INTRAMUSCULAR; INTRAVENOUS ONCE
Refills: 0 | Status: COMPLETED | OUTPATIENT
Start: 2022-12-27 | End: 2022-12-27

## 2022-12-27 RX ORDER — CEFTRIAXONE 500 MG/1
1000 INJECTION, POWDER, FOR SOLUTION INTRAMUSCULAR; INTRAVENOUS EVERY 24 HOURS
Refills: 0 | Status: DISCONTINUED | OUTPATIENT
Start: 2022-12-28 | End: 2022-12-31

## 2022-12-27 RX ORDER — INSULIN LISPRO 100/ML
4 VIAL (ML) SUBCUTANEOUS
Refills: 0 | Status: DISCONTINUED | OUTPATIENT
Start: 2022-12-27 | End: 2022-12-28

## 2022-12-27 RX ORDER — FEBUXOSTAT 40 MG/1
80 TABLET ORAL DAILY
Refills: 0 | Status: DISCONTINUED | OUTPATIENT
Start: 2022-12-27 | End: 2022-12-31

## 2022-12-27 RX ORDER — DEXTROSE 50 % IN WATER 50 %
12.5 SYRINGE (ML) INTRAVENOUS ONCE
Refills: 0 | Status: DISCONTINUED | OUTPATIENT
Start: 2022-12-27 | End: 2022-12-31

## 2022-12-27 RX ORDER — INSULIN LISPRO 100/ML
VIAL (ML) SUBCUTANEOUS
Refills: 0 | Status: DISCONTINUED | OUTPATIENT
Start: 2022-12-27 | End: 2022-12-28

## 2022-12-27 RX ORDER — GLUCAGON INJECTION, SOLUTION 0.5 MG/.1ML
1 INJECTION, SOLUTION SUBCUTANEOUS ONCE
Refills: 0 | Status: DISCONTINUED | OUTPATIENT
Start: 2022-12-27 | End: 2022-12-31

## 2022-12-27 RX ORDER — MAGNESIUM SULFATE 500 MG/ML
1 VIAL (ML) INJECTION ONCE
Refills: 0 | Status: COMPLETED | OUTPATIENT
Start: 2022-12-27 | End: 2022-12-27

## 2022-12-27 RX ORDER — SODIUM CHLORIDE 9 MG/ML
2000 INJECTION, SOLUTION INTRAVENOUS ONCE
Refills: 0 | Status: COMPLETED | OUTPATIENT
Start: 2022-12-27 | End: 2022-12-27

## 2022-12-27 RX ORDER — LANOLIN ALCOHOL/MO/W.PET/CERES
3 CREAM (GRAM) TOPICAL AT BEDTIME
Refills: 0 | Status: DISCONTINUED | OUTPATIENT
Start: 2022-12-27 | End: 2022-12-31

## 2022-12-27 RX ORDER — SODIUM CHLORIDE 9 MG/ML
1000 INJECTION INTRAMUSCULAR; INTRAVENOUS; SUBCUTANEOUS ONCE
Refills: 0 | Status: COMPLETED | OUTPATIENT
Start: 2022-12-27 | End: 2022-12-27

## 2022-12-27 RX ORDER — POTASSIUM CHLORIDE 20 MEQ
40 PACKET (EA) ORAL EVERY 4 HOURS
Refills: 0 | Status: COMPLETED | OUTPATIENT
Start: 2022-12-27 | End: 2022-12-27

## 2022-12-27 RX ORDER — INSULIN GLARGINE 100 [IU]/ML
12 INJECTION, SOLUTION SUBCUTANEOUS
Refills: 0 | Status: DISCONTINUED | OUTPATIENT
Start: 2022-12-28 | End: 2022-12-28

## 2022-12-27 RX ORDER — INSULIN HUMAN 100 [IU]/ML
8 INJECTION, SOLUTION SUBCUTANEOUS ONCE
Refills: 0 | Status: DISCONTINUED | OUTPATIENT
Start: 2022-12-27 | End: 2022-12-27

## 2022-12-27 RX ORDER — INSULIN LISPRO 100/ML
VIAL (ML) SUBCUTANEOUS
Refills: 0 | Status: DISCONTINUED | OUTPATIENT
Start: 2022-12-27 | End: 2022-12-27

## 2022-12-27 RX ORDER — SODIUM CHLORIDE 9 MG/ML
1000 INJECTION, SOLUTION INTRAVENOUS
Refills: 0 | Status: DISCONTINUED | OUTPATIENT
Start: 2022-12-27 | End: 2022-12-31

## 2022-12-27 RX ORDER — INSULIN LISPRO 100/ML
VIAL (ML) SUBCUTANEOUS AT BEDTIME
Refills: 0 | Status: DISCONTINUED | OUTPATIENT
Start: 2022-12-27 | End: 2022-12-28

## 2022-12-27 RX ORDER — INSULIN GLARGINE 100 [IU]/ML
12 INJECTION, SOLUTION SUBCUTANEOUS ONCE
Refills: 0 | Status: COMPLETED | OUTPATIENT
Start: 2022-12-27 | End: 2022-12-27

## 2022-12-27 RX ORDER — INSULIN HUMAN 100 [IU]/ML
8 INJECTION, SOLUTION SUBCUTANEOUS
Qty: 100 | Refills: 0 | Status: DISCONTINUED | OUTPATIENT
Start: 2022-12-27 | End: 2022-12-27

## 2022-12-27 RX ORDER — DEXTROSE 50 % IN WATER 50 %
15 SYRINGE (ML) INTRAVENOUS ONCE
Refills: 0 | Status: DISCONTINUED | OUTPATIENT
Start: 2022-12-27 | End: 2022-12-31

## 2022-12-27 RX ORDER — DEXTROSE 50 % IN WATER 50 %
25 SYRINGE (ML) INTRAVENOUS ONCE
Refills: 0 | Status: DISCONTINUED | OUTPATIENT
Start: 2022-12-27 | End: 2022-12-31

## 2022-12-27 RX ORDER — HEPARIN SODIUM 5000 [USP'U]/ML
5000 INJECTION INTRAVENOUS; SUBCUTANEOUS EVERY 8 HOURS
Refills: 0 | Status: DISCONTINUED | OUTPATIENT
Start: 2022-12-27 | End: 2022-12-31

## 2022-12-27 RX ADMIN — SODIUM CHLORIDE 2000 MILLILITER(S): 9 INJECTION, SOLUTION INTRAVENOUS at 06:26

## 2022-12-27 RX ADMIN — Medication 3 MILLIGRAM(S): at 21:41

## 2022-12-27 RX ADMIN — SODIUM CHLORIDE 100 MILLILITER(S): 9 INJECTION, SOLUTION INTRAVENOUS at 15:44

## 2022-12-27 RX ADMIN — Medication 40 MILLIEQUIVALENT(S): at 14:57

## 2022-12-27 RX ADMIN — HEPARIN SODIUM 5000 UNIT(S): 5000 INJECTION INTRAVENOUS; SUBCUTANEOUS at 21:42

## 2022-12-27 RX ADMIN — Medication 4 UNIT(S): at 19:33

## 2022-12-27 RX ADMIN — Medication 6: at 18:10

## 2022-12-27 RX ADMIN — CEFTRIAXONE 100 MILLIGRAM(S): 500 INJECTION, POWDER, FOR SOLUTION INTRAMUSCULAR; INTRAVENOUS at 09:02

## 2022-12-27 RX ADMIN — Medication 100 GRAM(S): at 09:02

## 2022-12-27 RX ADMIN — INSULIN GLARGINE 12 UNIT(S): 100 INJECTION, SOLUTION SUBCUTANEOUS at 14:47

## 2022-12-27 RX ADMIN — SODIUM CHLORIDE 100 MILLILITER(S): 9 INJECTION, SOLUTION INTRAVENOUS at 17:01

## 2022-12-27 RX ADMIN — Medication 40 MILLIEQUIVALENT(S): at 18:14

## 2022-12-27 RX ADMIN — SODIUM CHLORIDE 1000 MILLILITER(S): 9 INJECTION INTRAMUSCULAR; INTRAVENOUS; SUBCUTANEOUS at 06:56

## 2022-12-27 NOTE — ED ADULT NURSE NOTE - OBJECTIVE STATEMENT
Bobo Rn: pt presents to ED A&04 ambualtory at baseline coming in compalining of x4 days of intermittent chills, weakness, headache/neck pain. Patient is p/w hyperglycemia and hypotension. Respirations even and unlabored. Lung sounds clear with equal chest rise bilaterally. ABD is soft, non tender, non distended with normal active bowel sounds No complaints of chest pain, nausea, dizziness, vomiting  SOB, fever, verbalized. 20GRAC and 18GLAC in place, fluids running will continue to monitor. report given to primary RN Mayuri

## 2022-12-27 NOTE — ED PROVIDER NOTE - PROGRESS NOTE DETAILS
Maira Rubalcava MD PGY2: Patient hypotensive on arrival, HR 100s. Sepsis w/u started. Brad, PGY2 - Received sign-out on patient. Introduced myself and updated patient on the medical evaluation process. Patient aware and in agreement. To send urine now. repeat bmp. magnesium repletion. tba Brad, PGY2 - Hospitalist Ryan consulted for admission for urosepsis and hyponatremia, accepting patient

## 2022-12-27 NOTE — ED PROVIDER NOTE - CLINICAL SUMMARY MEDICAL DECISION MAKING FREE TEXT BOX
Patient is a 62y M PMHx DM (Sitagliptin, Febuxostat, Steglatro), HTN, p/w hyperglycemia, chills. Concern for sepsis unknown origin at this time. More likely UTI, lung exam clear. If no source found on initial work-up, consider other etiologies. Patient without signs of meningismus, full neck ROM, no neck stiffness, no pain with neck flexion.

## 2022-12-27 NOTE — H&P ADULT - HISTORY OF PRESENT ILLNESS
62 year old man with history of DMII on metformin ertugliflozin, gout on febuxostat, HTN on metoprolol and Valsartan-HCTZ presenting with burning with urination since Thursday (12/22). Per patient he had had chronic lower back pain that was thought be due to a kidney stone, so he underwent cystoscopy on 12/22 and per patient following the procedure he started to have burning with urination. He also endorsed having fevers and chills, with Tmax of 102.2F, for which ibuprofen was helping him. He went to his PCP on Monday 12/26, had urine studies sent and was started on Cipro 500mg BID, for which he took one day's worth. Patient now presents for worsening of his symptoms and for episode of low blood pressure for which he said was 81/61. Patient denied chest pain, shortness of breath. Endorsed that his appetite had been gone, but that due to one of his diabetes medications was still making sure to take in a lot of water. He denied nausea, vomiting, diarrhea, constipation. No endorsed discharge from his penis.     In the ED, patient had hypotension to 81/59 with HR of 89, temp of 100F, saturating well on RA. No leukocytosis on labs, hemoglobin of 10.2. Hyponatremic down to 122 with elevated glucose to 351, Cr elevated to 2.16. AG of 18 with elevated BHB, and lactate, non-acidotic on VBG.  62 year old man with history of DMII on metformin ertugliflozin, gout on febuxostat, HTN on metoprolol and Valsartan-HCTZ presenting with burning with urination since Thursday (12/22). Per patient he had had chronic lower back pain that was thought be due to a kidney stone, so he underwent cystoscopy on 12/22 and per patient following the procedure he started to have burning with urination. He also endorsed having fevers and chills, with Tmax of 102.2F, for which ibuprofen was helping him. He went to his PCP on Monday 12/26, had urine studies sent and was started on Cipro 500mg BID, for which he took one day's worth. Patient now presents for worsening of his symptoms and for episode of low blood pressure for which he said was 81/61. Patient denied chest pain, shortness of breath. Endorsed that his appetite had been gone, but that due to one of his diabetes medications was still making sure to take in a lot of water. He denied nausea, vomiting, diarrhea, constipation. No endorsed discharge from his penis.     In the ED, patient had hypotension to 81/59 with HR of 89, temp of 100F, saturating well on RA. No leukocytosis on labs, hemoglobin of 10.2. Hyponatremic down to 122 with elevated glucose to 351, Cr elevated to 2.16. AG of 18 with elevated BHB, and lactate, non-acidotic on VBG. Small ketones, negative nitrite, moderate leuk esterase on UA. CXR clear. Patient received 3L IVF, was given dose of 1g ceftriaxone

## 2022-12-27 NOTE — CONSULT NOTE ADULT - SUBJECTIVE AND OBJECTIVE BOX
PATIENT SEEN AND EXAMINED ON :- 12/27/22  DATE OF SERVICE:  12/27/22           Interim events noted,Labs ,Radiological studies and Cardiology tests reviewed .       HOSPITAL COURSE: HPI:  62 year old man with history of DMII on metformin ertugliflozin, gout on febuxostat, HTN on metoprolol and Valsartan-HCTZ presenting with burning with urination since Thursday (12/22). Per patient he had had chronic lower back pain that was thought be due to a kidney stone, so he underwent cystoscopy on 12/22 and per patient following the procedure he started to have burning with urination. He also endorsed having fevers and chills, with Tmax of 102.2F, for which ibuprofen was helping him. He went to his PCP on Monday 12/26, had urine studies sent and was started on Cipro 500mg BID, for which he took one day's worth. Patient now presents for worsening of his symptoms and for episode of low blood pressure for which he said was 81/61. Patient denied chest pain, shortness of breath. Endorsed that his appetite had been gone, but that due to one of his diabetes medications was still making sure to take in a lot of water. He denied nausea, vomiting, diarrhea, constipation. No endorsed discharge from his penis.     In the ED, patient had hypotension to 81/59 with HR of 89, temp of 100F, saturating well on RA. No leukocytosis on labs, hemoglobin of 10.2. Hyponatremic down to 122 with elevated glucose to 351, Cr elevated to 2.16. AG of 18 with elevated BHB, and lactate, non-acidotic on VBG. Small ketones, negative nitrite, moderate leuk esterase on UA. CXR clear. Patient received 3L IVF, was given dose of 1g ceftriaxone (27 Dec 2022 13:11)      INTERIM EVENTS:Patient seen at bedside ,interim events noted.      PMH -reviewed admission note, no change since admission  HEART FAILURE: Acute[ ]Chronic[ ] Systolic[ ] Diastolic[ ] Combined Systolic and Diastolic[ ]  CAD[ ] CABG[ ] PCI[ ]  DEVICES[ ] PPM[ ] ICD[ ] ILR[ ]  ATRIAL FIBRILLATION[ ] Paroxysmal[ ] Permanent[ ] CHADS2-[  ]  NAHEED[ ] CKD1[ ] CKD2[ ] CKD3[ ] CKD4[ ] ESRD[ ]  COPD[ ] HTN[ ]   DM[ ] Type1[ ] Type 2[ ]   CVA[ ] Paresis[ ]    AMBULATION: Assisted[ ] Cane/walker[ ] Independent[ ]    MEDICATIONS  (STANDING):  cefTRIAXone   IVPB 1000 milliGRAM(s) IV Intermittent every 24 hours  dextrose 5%. 1000 milliLiter(s) (100 mL/Hr) IV Continuous <Continuous>  dextrose 5%. 1000 milliLiter(s) (50 mL/Hr) IV Continuous <Continuous>  dextrose 50% Injectable 25 Gram(s) IV Push once  dextrose 50% Injectable 12.5 Gram(s) IV Push once  dextrose 50% Injectable 25 Gram(s) IV Push once  febuxostat 80 milliGRAM(s) Oral daily  glucagon  Injectable 1 milliGRAM(s) IntraMuscular once  heparin   Injectable 5000 Unit(s) SubCutaneous every 8 hours  insulin lispro (ADMELOG) corrective regimen sliding scale   SubCutaneous at bedtime  insulin lispro (ADMELOG) corrective regimen sliding scale   SubCutaneous three times a day before meals  insulin lispro Injectable (ADMELOG) 4 Unit(s) SubCutaneous three times a day before meals    MEDICATIONS  (PRN):  dextrose Oral Gel 15 Gram(s) Oral once PRN Blood Glucose LESS THAN 70 milliGRAM(s)/deciliter      REVIEW OF SYSTEMS:  Constitutional: [ ] fever, [ ]weight loss,  [ ]fatigue [ ]weight gain  Eyes: [ ] visual changes  Respiratory: [ ]shortness of breath;  [ ] cough, [ ]wheezing, [ ]chills, [ ]hemoptysis  Cardiovascular: [ ] chest pain, [ ]palpitations, [ ]dizziness,  [ ]leg swelling[ ]orthopnea[ ]PND  Gastrointestinal: [ ] abdominal pain, [ ]nausea, [ ]vomiting,  [ ]diarrhea [ ]Constipation [ ]Melena  Genitourinary: [ ] dysuria, [ ] hematuria [ ]Vidal  Neurologic: [ ] headaches [ ] tremors[ ]weakness [ ]Paralysis Right[ ] Left[ ]  Skin: [ ] itching, [ ]burning, [ ] rashes  Endocrine: [ ] heat or cold intolerance  Musculoskeletal: [ ] joint pain or swelling; [ ] muscle, back, or extremity pain  Psychiatric: [ ] depression, [ ]anxiety, [ ]mood swings, or [ ]difficulty sleeping  Hematologic: [ ] easy bruising, [ ] bleeding gums    [ ] All remaining systems negative except as per above.   [ ]Unable to obtain.  [x] No change in ROS since admission      Vital Signs Last 24 Hrs  T(C): 37.7 (27 Dec 2022 16:48), Max: 37.8 (27 Dec 2022 05:32)  T(F): 99.9 (27 Dec 2022 16:48), Max: 100 (27 Dec 2022 05:32)  HR: 90 (27 Dec 2022 16:48) (79 - 99)  BP: 118/82 (27 Dec 2022 16:48) (81/59 - 119/72)  BP(mean): 80 (27 Dec 2022 06:54) (72 - 80)  RR: 18 (27 Dec 2022 16:48) (16 - 20)  SpO2: 100% (27 Dec 2022 16:48) (99% - 100%)    Parameters below as of 27 Dec 2022 16:48  Patient On (Oxygen Delivery Method): room air      I&O's Summary      PHYSICAL EXAM:  General: No acute distress BMI-  HEENT: EOMI, PERRL  Neck: Supple, [ ] JVD  Lungs: Equal air entry bilaterally; [ ] rales [ ] wheezing [ ] rhonchi  Heart: Regular rate and rhythm; [x ] murmur   2/6 [ x] systolic [ ] diastolic [ ] radiation[ ] rubs [ ]  gallops  Abdomen: Nontender, bowel sounds present  Extremities: No clubbing, cyanosis, [ ] edema [ ]Pulses  equal and intact  Nervous system:  Alert & Oriented X3, no focal deficits  Psychiatric: Normal affect  Skin: No rashes or lesions    LABS:  12-27    132<L>  |  98  |  34<H>  ----------------------------<  280<H>  4.1   |  19<L>  |  1.60<H>    Ca    8.5      27 Dec 2022 18:05  Phos  2.1     12-27  Mg     1.40     12-27    TPro  6.0  /  Alb  2.9<L>  /  TBili  1.1  /  DBili  x   /  AST  19  /  ALT  19  /  AlkPhos  90  12-27    Creatinine Trend: 1.60<--, 1.95<--, 2.16<--                        10.2   5.67  )-----------( 133      ( 27 Dec 2022 06:25 )             32.5     PT/INR - ( 27 Dec 2022 06:25 )   PT: 12.7 sec;   INR: 1.09 ratio         PTT - ( 27 Dec 2022 06:25 )  PTT:28.9 sec

## 2022-12-27 NOTE — ED PROVIDER NOTE - PHYSICAL EXAMINATION
GENERAL: no acute distress  HEENT: PERRLA, EOMI, normal conjunctiva  CARDIAC: regular rate and rhythm, no appreciable murmurs  PULM: clear to ascultation bilaterally, no crackles, rales, rhonchi, or wheezing  GI: abdomen nondistended, soft, nontender, no guarding or rebound tenderness  : no CVA tenderness, no suprapubic tenderness  NEURO: alert and oriented x 3, normal speech, no gross neurologic deficit  MSK: no visible deformities  SKIN: no visible rashes, warm, dry, well-perfused  PSYCH: appropriate mood and affect

## 2022-12-27 NOTE — H&P ADULT - PROBLEM SELECTOR PLAN 1
- Borderline SIRS criteria with his heart rate, had fevers as outpatient, 100F here  - Likely source is in patient's urine given history and presentation  - Monitor for further fevers  - Hypotensive on admission, resolved with IVF, on maintenance fluids  - F/U urine and blood cultures  - C/W ceftriaxone for now given likely urinary source  - ?CT A/P for possible prostatitis - Borderline SIRS criteria with his heart rate, had fevers as outpatient, 100F here  - Likely urinary source iso patient's history and data  - Monitor for further fevers  - Hypotensive on admission, resolved with IVF, on maintenance fluids  - F/U urine and blood cultures. Will F/U outpatient urine culture (sent Monday, possible result tomorrow)  - C/W ceftriaxone for now given likely urinary source  - ?CT A/P for possible prostatitis - Borderline SIRS criteria with his heart rate, had fevers as outpatient, 100F here  - Likely urinary source iso patient's history and data  - Monitor for further fevers  - Hypotensive on admission, resolved with IVF, on maintenance fluids  - F/U urine and blood cultures. Will F/U outpatient urine culture (sent Monday, possible result tomorrow)  - C/W ceftriaxone for now given likely urinary source  - F/U CT A/P for possible prostatitis

## 2022-12-27 NOTE — H&P ADULT - PROBLEM SELECTOR PLAN 7
- On valsartan-HCTZ and metoprolol at home  - Holding iso episodes of hypotension, and NAHEED  - Continue to monitor BPs - 10.6 on admission, - 10.6 on admission  - MCV of ~60  - Possible thalassemia, will F/U with patient

## 2022-12-27 NOTE — H&P ADULT - ATTENDING COMMENTS
Patient seen and examined by me. Case discussed with resident and agree with the resident's findings and plan as documented in the resident's note. 62M h/o DM-II on metformin/ertugliflozin, gout on febuxostat, HTN on metoprolol and Valsartan-HCTZ presenting with burning with urination since Thursday 12/22 s/p cystoscopy 12/21 a/w sepsis 2/2 UTI versus prostatitis course c/b DKA.    1. Sepsis 2/2 UTI vs. prostatitis:  -f/u outpatient PCP urine culture and urine culture in-house  -c/w ceftriaxone IV for now  -monitor fever curve  -check CT abd/pelvis without contrast given NAHEED  -f/u repeat lactate on vBG    2. Hypotension;  -likely 2/2 sepsis  -s/p 3L bolus and now NS@100cc/hr  -holding BP meds    3. DKA:  -patient with elevated anion gap, glucose, BHB, small ketones on urinalysis, decreased bicarb, borderline pH on VBG on urinalysis.  -f/u endo recs  -ZoX9c=0.0%; holding home DM meds    4. NAHEED likely 2/2 pre-renal azotemia:  -baseline creatinine 1.21  -f/u urine studies to check FENA    5. Hyponatremia:  -likely pseudohyponatremia 2/2 hyperglycemia  -poor PO intake  -f/u urine studies  -monitor BMP - will repeat BMP at 5PM    6. Thrombocytopenia:  -likely 2/2 sepsis    7. Anemia:  -check iron studies  -given MCV in the 60s, likely 2/2 thalassemia  -will clarify with patient    8. DVT Px:  -c/w heparin subq tid Patient seen and examined by me. Case discussed with resident and agree with the resident's findings and plan as documented in the resident's note. 62M h/o DM-II on metformin/ertugliflozin, gout on febuxostat, HTN on metoprolol and Valsartan-HCTZ presenting with burning with urination since Thursday 12/22 s/p cystoscopy 12/21 a/w sepsis 2/2 UTI versus prostatitis course c/b DKA.    1. Sepsis 2/2 UTI vs. prostatitis:  -f/u outpatient PCP urine culture and urine culture in-house  -c/w ceftriaxone IV for now  -monitor fever curve  -check CT abd/pelvis without contrast given NAHEED  -f/u repeat lactate on vBG    2. Hypotension likely 2/2 sepsis:  -s/p 3L bolus and now NS@100cc/hr  -holding BP meds  -monitor BP closely    3. DKA:  -patient with elevated anion gap, glucose, BHB, small ketones on urinalysis, decreased bicarb, borderline pH on VBG on urinalysis.  -Per endo, lantus 12U, with moderate admelog correction q4hr. F/U repeat BMP, VBG, BHB in 2 hours after lantus   -Q1hr fingersticks, BMP, VBG, BHB q4 and replete electrolytes as needed  - D5 to fluids when fingerstick at or below 250  - NPO for now  -f/u endo recs  -KyN6g=9.0%; holding home DM meds    4. NAHEED likely 2/2 pre-renal azotemia:  -baseline creatinine 1.21  -f/u urine studies to check FENA    5. Hyponatremia:  -likely pseudohyponatremia 2/2 hyperglycemia  -poor PO intake  -f/u urine studies  -monitor BMP - will repeat BMP at 5PM    6. Thrombocytopenia:  -likely 2/2 sepsis    7. Anemia:  -check iron studies  -given MCV in the 60s, likely 2/2 thalassemia  -will clarify with patient    8. DVT Px:  -c/w heparin subq tid

## 2022-12-27 NOTE — ED ADULT NURSE REASSESSMENT NOTE - NS ED NURSE REASSESS COMMENT FT1
Pt report received - pt in no acute distress denies any discomfort at this time - possible admission for UTI - olegario cowan

## 2022-12-27 NOTE — H&P ADULT - PROBLEM SELECTOR PLAN 9
- DVT: Heparin 5000U q8hr  - Diet: NPO for now while treating for DKA - On valsartan-HCTZ and metoprolol at home  - Holding iso episodes of hypotension, and NAHEED  - Continue to monitor BPs

## 2022-12-27 NOTE — H&P ADULT - PROBLEM SELECTOR PLAN 5
- Had been seen at his PCP on Monday 12/26/22 and had urine studies sent, was started on Cipro 500mg BID  - UA here with moderate leuk esterase, negative nitrite, 13 WBC/hpf  - S/P dose of ceftriaxone in the ED, will continue on ceftriaxone  - F/U outpatient urine culture and culture sent here - Likely due to outpatient cystoscopy done on 12/22, especially given patient endorsement that symptoms started following that procedure  - Had been seen at his PCP on Monday 12/26/22 and had urine studies sent, was started on Cipro 500mg BID  - UA here with moderate leuk esterase, negative nitrite, 13 WBC/hpf  - S/P dose of ceftriaxone in the ED, will continue on ceftriaxone  - F/U outpatient urine culture and culture sent here - 122 on admission, 127 on repeat  - Poor PO intake, but has normal serum osm, hyperglycemia, likely is pseudohyponatremia due to hypertonicity given hyperglycemia  - Sodium corrects to 131 based on glucose level  - Will continue to monitor

## 2022-12-27 NOTE — H&P ADULT - NSHPLABSRESULTS_GEN_ALL_CORE
10.2   5.67  )-----------( 133      ( 27 Dec 2022 06:25 )             32.5         127<L>  |  93<L>  |  41<H>  ----------------------------<  320<H>  3.8   |  18<L>  |  1.95<H>    Ca    8.1<L>      27 Dec 2022 08:30  Phos  2.1       Mg     1.40         TPro  6.0  /  Alb  2.9<L>  /  TBili  1.1  /  DBili  x   /  AST  19  /  ALT  19  /  AlkPhos  90      PT/INR - ( 27 Dec 2022 06:25 )   PT: 12.7 sec;   INR: 1.09 ratio    PTT - ( 27 Dec 2022 06:25 )  PTT:28.9 sec    Beta Hydroxy-Butyrate: 2.3 mmol/L (22 @ 08:30)   Beta Hydroxy-Butyrate: 2.2 mmol/L (22 @ 06:25)     06:25 - VBG - pH: 7.34  | pCO2: 34    | pO2: 45    | Lactate: 2.7      Urinalysis Basic - ( 27 Dec 2022 08:10 )    Color: Light Yellow / Appearance: Clear / S.010 / pH: x  Gluc: x / Ketone: Trace  / Bili: Negative / Urobili: <2 mg/dL   Blood: x / Protein: Trace / Nitrite: Negative   Leuk Esterase: Moderate / RBC: 3 /HPF / WBC 13 /HPF   Sq Epi: x / Non Sq Epi: 2 /HPF / Bacteria: Occasional    < from: Xray Chest 1 View- PORTABLE-Urgent (22 @ 07:26) >        INTERPRETATION:  CLINICAL INFORMATION: Sepsis    Time of Exam:  2022 at 7:23 AM    EXAM:  Frontal Chest    FINDINGS:  Both lungs are equally aerated and free of any focal abnormalities. The   heart is not enlarged and there is no effusion.    The bones are intact    COMPARISON:  None Available        IMPRESSION: Clear lungs.

## 2022-12-27 NOTE — CONSULT NOTE ADULT - SUBJECTIVE AND OBJECTIVE BOX
CHIEF COMPLAINT:     62 year old man with history of DMII on metformin ertugliflozin, gout on febuxostat, HTN on metoprolol and Valsartan-HCTZ presenting with burning with urination since Thursday (). Per patient he had had chronic lower back pain that was thought be due to a kidney stone, so he underwent ureteroscopy on  and per patient following the procedure he started to have burning with urination. He also endorsed having fevers and chills, with Tmax of 102.2F, for which ibuprofen was helping him. He went to his PCP on , had urine studies sent and was started on Cipro 500mg BID, for which he took one day's worth. Patient now presents for worsening of his symptoms and for episode of low blood pressure for which he said was 81/61. Patient denied chest pain, shortness of breath. Endorsed that his appetite had been gone, but that due to one of his diabetes medications was still making sure to take in a lot of water. He denied nausea, vomiting, diarrhea, constipation, abdominal pain, CP, SOB.     In the ED, patient had hypotension to 81/59 with HR of 89, temp of 100F, saturating well on RA. No leukocytosis on labs, hemoglobin of 10.2. Hyponatremic down to 122 with elevated glucose to 351, Cr elevated to 2.16. AG of 18 with elevated BHB, and lactate, non-acidotic on VBG with bicarb 18. Small ketones, negative nitrite, moderate leuk esterase on UA. CXR clear. Patient received 3L IVF, was given dose of 1g ceftriaxone.     MICU consulted for DKA management.      FAMILY HISTORY:  No pertinent family hx.    SOCIAL HISTORY:  Smoking: Denies  EtOH Use: Denies    Allergies    No Known Allergies    Intolerances        HOME MEDICATIONS:    REVIEW OF SYSTEMS:  CONSTITUTIONAL: +fever, chills, and fatigue  EYES: No eye pain, visual disturbances, or discharge  ENMT:  No difficulty hearing, tinnitus, vertigo; No sinus or throat pain  NECK: No pain or stiffness  RESPIRATORY: No cough, wheezing, or hemoptysis; No shortness of breath  CARDIOVASCULAR: No chest pain, palpitations, dizziness, or leg swelling  GASTROINTESTINAL: No abdominal or epigastric pain. No nausea, vomiting, or hematemesis; No diarrhea or constipation.   GENITOURINARY: +Dysuria  NEUROLOGICAL: No headaches, memory loss, loss of strength, numbness, or tremors  SKIN: No itching, burning, rashes, or lesions       OBJECTIVE:  ICU Vital Signs Last 24 Hrs  T(C): 36.7 (27 Dec 2022 13:32), Max: 37.8 (27 Dec 2022 05:32)  T(F): 98 (27 Dec 2022 13:32), Max: 100 (27 Dec 2022 05:32)  HR: 82 (27 Dec 2022 13:32) (80 - 99)  BP: 114/73 (27 Dec 2022 13:32) (81/59 - 114/73)  BP(mean): 80 (27 Dec 2022 06:54) (72 - 80)  ABP: --  ABP(mean): --  RR: 18 (27 Dec 2022 13:32) (16 - 20)  SpO2: 100% (27 Dec 2022 13:32) (99% - 100%)    O2 Parameters below as of 27 Dec 2022 13:32  Patient On (Oxygen Delivery Method): room air              CAPILLARY BLOOD GLUCOSE      POCT Blood Glucose.: 275 mg/dL (27 Dec 2022 14:44)      PHYSICAL EXAM:  GENERAL: NAD, well-developed  HEAD:  Atraumatic, Normocephalic  EYES: EOMI, PERRLA, conjunctiva and sclera clear  NECK: Supple, No JVD  CHEST/LUNG: Clear to auscultation bilaterally; No wheeze  HEART: Regular rate and rhythm; No murmurs, rubs, or gallops  ABDOMEN: Soft, Nontender, Nondistended; Bowel sounds present  EXTREMITIES:  2+ Peripheral Pulses, No clubbing, cyanosis, or edema  PSYCH: AAOx3  NEUROLOGY: non-focal  SKIN: No rashes or lesions      HOSPITAL MEDICATIONS:  MEDICATIONS  (STANDING):  cefTRIAXone   IVPB 1000 milliGRAM(s) IV Intermittent every 24 hours  dextrose 5% + sodium chloride 0.9%. 1000 milliLiter(s) (100 mL/Hr) IV Continuous <Continuous>  dextrose 5%. 1000 milliLiter(s) (100 mL/Hr) IV Continuous <Continuous>  dextrose 5%. 1000 milliLiter(s) (50 mL/Hr) IV Continuous <Continuous>  dextrose 50% Injectable 25 Gram(s) IV Push once  dextrose 50% Injectable 12.5 Gram(s) IV Push once  dextrose 50% Injectable 25 Gram(s) IV Push once  febuxostat 80 milliGRAM(s) Oral daily  glucagon  Injectable 1 milliGRAM(s) IntraMuscular once  heparin   Injectable 5000 Unit(s) SubCutaneous every 8 hours  insulin lispro (ADMELOG) corrective regimen sliding scale   SubCutaneous every 4 hours  potassium chloride    Tablet ER 40 milliEquivalent(s) Oral every 4 hours    MEDICATIONS  (PRN):  dextrose Oral Gel 15 Gram(s) Oral once PRN Blood Glucose LESS THAN 70 milliGRAM(s)/deciliter      LABS:                        10.2   5.67  )-----------( 133      ( 27 Dec 2022 06:25 )             32.5         127<L>  |  93<L>  |  41<H>  ----------------------------<  320<H>  3.8   |  18<L>  |  1.95<H>    Ca    8.1<L>      27 Dec 2022 08:30  Phos  2.1       Mg     1.40         TPro  6.0  /  Alb  2.9<L>  /  TBili  1.1  /  DBili  x   /  AST  19  /  ALT  19  /  AlkPhos  90      PT/INR - ( 27 Dec 2022 06:25 )   PT: 12.7 sec;   INR: 1.09 ratio         PTT - ( 27 Dec 2022 06:25 )  PTT:28.9 sec  Urinalysis Basic - ( 27 Dec 2022 08:10 )    Color: Light Yellow / Appearance: Clear / S.010 / pH: x  Gluc: x / Ketone: Trace  / Bili: Negative / Urobili: <2 mg/dL   Blood: x / Protein: Trace / Nitrite: Negative   Leuk Esterase: Moderate / RBC: 3 /HPF / WBC 13 /HPF   Sq Epi: x / Non Sq Epi: 2 /HPF / Bacteria: Occasional        Venous Blood Gas:   @ 06:25  7.34/34/45/18/70.3  VBG Lactate: 2.7      MICROBIOLOGY:     RADIOLOGY:  [X] Reviewed and interpreted by me    EKG:      ASSESSMENT:  62 year old man with history of DMII on sitagliptin-metformin, gout, HTN presenting with burning with urination x4 days following ureteroscopy with +UA c/f UTI vs. prostatitis c/b DKA (hyperglycemic to 300s with BHB 2.3, borderline VBG). MICU consulted for further management.    Recommendations:  - Continue IVF  - Endocrine aware, f/u recs  - NPO for now  - Patient started on Lantus 12  - q4 moderate Admelog correction scales with q4 DKA labs  - c/w abx for UTI vs. prostatis  - Rest of care per primary team    Patient is not a MICU candidate at this time.    D/w Dr. Candelaria CHIEF COMPLAINT: back pain, fever    62 year old man with history of DMII on metformin ertugliflozin, gout on febuxostat, HTN on metoprolol and Valsartan-HCTZ presenting with burning with urination since Thursday (). Per patient he had had chronic lower back pain that was thought be due to a kidney stone, so he underwent ureteroscopy on  and per patient following the procedure he started to have burning with urination. He also endorsed having fevers and chills, with Tmax of 102.2F, for which ibuprofen was helping him. He went to his PCP on , had urine studies sent and was started on Cipro 500mg BID, for which he took one day's worth. Patient now presents for worsening of his symptoms and for episode of low blood pressure for which he said was 81/61. Patient denied chest pain, shortness of breath. Endorsed that his appetite had been gone, but that due to one of his diabetes medications was still making sure to take in a lot of water. He denied nausea, vomiting, diarrhea, constipation, abdominal pain, CP, SOB.     In the ED, patient had hypotension to 81/59 with HR of 89, temp of 100F, saturating well on RA. No leukocytosis on labs, hemoglobin of 10.2. Hyponatremic down to 122 with elevated glucose to 351, Cr elevated to 2.16. AG of 18 with elevated BHB, and lactate, non-acidotic on VBG with bicarb 18. Small ketones, negative nitrite, moderate leuk esterase on UA. CXR clear. Patient received 3L IVF, was given dose of 1g ceftriaxone.     MICU consulted for DKA management.      FAMILY HISTORY:  No pertinent family hx.    SOCIAL HISTORY:  Smoking: Denies  EtOH Use: Denies    Allergies    No Known Allergies    Intolerances        HOME MEDICATIONS:    REVIEW OF SYSTEMS:  CONSTITUTIONAL: +fever, chills, and fatigue  EYES: No eye pain, visual disturbances, or discharge  ENMT:  No difficulty hearing, tinnitus, vertigo; No sinus or throat pain  NECK: No pain or stiffness  RESPIRATORY: No cough, wheezing, or hemoptysis; No shortness of breath  CARDIOVASCULAR: No chest pain, palpitations, dizziness, or leg swelling  GASTROINTESTINAL: No abdominal or epigastric pain. No nausea, vomiting, or hematemesis; No diarrhea or constipation.   GENITOURINARY: +Dysuria  NEUROLOGICAL: No headaches, memory loss, loss of strength, numbness, or tremors  SKIN: No itching, burning, rashes, or lesions       OBJECTIVE:  ICU Vital Signs Last 24 Hrs  T(C): 36.7 (27 Dec 2022 13:32), Max: 37.8 (27 Dec 2022 05:32)  T(F): 98 (27 Dec 2022 13:32), Max: 100 (27 Dec 2022 05:32)  HR: 82 (27 Dec 2022 13:32) (80 - 99)  BP: 114/73 (27 Dec 2022 13:32) (81/59 - 114/73)  BP(mean): 80 (27 Dec 2022 06:54) (72 - 80)  ABP: --  ABP(mean): --  RR: 18 (27 Dec 2022 13:32) (16 - 20)  SpO2: 100% (27 Dec 2022 13:32) (99% - 100%)    O2 Parameters below as of 27 Dec 2022 13:32  Patient On (Oxygen Delivery Method): room air              CAPILLARY BLOOD GLUCOSE      POCT Blood Glucose.: 275 mg/dL (27 Dec 2022 14:44)      PHYSICAL EXAM:  GENERAL: NAD, well-developed  HEAD:  Atraumatic, Normocephalic  EYES: EOMI, PERRLA, conjunctiva and sclera clear  NECK: Supple, No JVD  CHEST/LUNG: Clear to auscultation bilaterally; No wheeze  HEART: Regular rate and rhythm; No murmurs, rubs, or gallops  ABDOMEN: Soft, Nontender, Nondistended; Bowel sounds present  EXTREMITIES:  2+ Peripheral Pulses, No clubbing, cyanosis, or edema  PSYCH: AAOx3  NEUROLOGY: non-focal  SKIN: No rashes or lesions      HOSPITAL MEDICATIONS:  MEDICATIONS  (STANDING):  cefTRIAXone   IVPB 1000 milliGRAM(s) IV Intermittent every 24 hours  dextrose 5% + sodium chloride 0.9%. 1000 milliLiter(s) (100 mL/Hr) IV Continuous <Continuous>  dextrose 5%. 1000 milliLiter(s) (100 mL/Hr) IV Continuous <Continuous>  dextrose 5%. 1000 milliLiter(s) (50 mL/Hr) IV Continuous <Continuous>  dextrose 50% Injectable 25 Gram(s) IV Push once  dextrose 50% Injectable 12.5 Gram(s) IV Push once  dextrose 50% Injectable 25 Gram(s) IV Push once  febuxostat 80 milliGRAM(s) Oral daily  glucagon  Injectable 1 milliGRAM(s) IntraMuscular once  heparin   Injectable 5000 Unit(s) SubCutaneous every 8 hours  insulin lispro (ADMELOG) corrective regimen sliding scale   SubCutaneous every 4 hours  potassium chloride    Tablet ER 40 milliEquivalent(s) Oral every 4 hours    MEDICATIONS  (PRN):  dextrose Oral Gel 15 Gram(s) Oral once PRN Blood Glucose LESS THAN 70 milliGRAM(s)/deciliter      LABS:                        10.2   5.67  )-----------( 133      ( 27 Dec 2022 06:25 )             32.5         127<L>  |  93<L>  |  41<H>  ----------------------------<  320<H>  3.8   |  18<L>  |  1.95<H>    Ca    8.1<L>      27 Dec 2022 08:30  Phos  2.1       Mg     1.40         TPro  6.0  /  Alb  2.9<L>  /  TBili  1.1  /  DBili  x   /  AST  19  /  ALT  19  /  AlkPhos  90      PT/INR - ( 27 Dec 2022 06:25 )   PT: 12.7 sec;   INR: 1.09 ratio         PTT - ( 27 Dec 2022 06:25 )  PTT:28.9 sec  Urinalysis Basic - ( 27 Dec 2022 08:10 )    Color: Light Yellow / Appearance: Clear / S.010 / pH: x  Gluc: x / Ketone: Trace  / Bili: Negative / Urobili: <2 mg/dL   Blood: x / Protein: Trace / Nitrite: Negative   Leuk Esterase: Moderate / RBC: 3 /HPF / WBC 13 /HPF   Sq Epi: x / Non Sq Epi: 2 /HPF / Bacteria: Occasional        Venous Blood Gas:   @ 06:25  7.34/34/45/18/70.3  VBG Lactate: 2.7      MICROBIOLOGY:     RADIOLOGY:  [X] Reviewed and interpreted by me    EKG:

## 2022-12-27 NOTE — H&P ADULT - PROBLEM SELECTOR PLAN 4
- 122 on admission, 127 on repeat  - Poor PO intake, but has normal serum osm, hyperglycemia, likely is pseudohyponatremia due to hypertonicity given hyperglycemia  - Sodium corrects to 131 based on glucose level  - Will continue to monitor - Cr 1.21 as per outpatient labs from 8/2022  - Possibly pre-renal etiology versus ATN from hypotension  - Trend Cr, dose meds by eGFR, avoid nephrotoxic agents  - F/U urine studies

## 2022-12-27 NOTE — CONSULT NOTE ADULT - ASSESSMENT
62 year old man with history of DM2 on metformin ertugliflozin, gout on febuxostat, HTN on metoprolol and Valsartan-HCTZ presenting with burning with urination since Thursday (12/22). In the ED, patient had hypotension to 81/59 with HR of 89, temp of 100F, saturating well on RA. No leukocytosis on labs, hemoglobin of 10.2. Hyponatremic down to 122 with elevated glucose to 351, Cr elevated to 2.16. AG of 18 with elevated BHB, and lactate, non-acidotic on VBG. Small ketones, negative nitrite, moderate leuk esterase on UA. CXR clear. Patient received 3L IVF, was given dose of 1g ceftriaxone. Was not started on an insulin drip. Endocrine consulted for DM2 and DKA.    Poorly controlled T2DM with hyperglycemia and DKA  DM diagnosis: 15 years ago  Last A1c: 9.0  Endocrinologist: Yesenia Fiore in [facility] DM meds: Steglatro 15mg daily and metformin 1g BID   -Hold oral DM agents while inpatient  -Started Lantus 12 units at 14:47 on 12/27. Continue this q24h. DO NOT HOLD IF NPO.  -Continue Admelog moderate correction scale q4h with q4h DKA labs until DKA resolves. The proposed ADA criteria for resolution of DKA includes a BG <200 mg/dL and two of the following 3 criteria: a serum bicarbonate level greater than or equal to 15 mEq/L, a venous pH >7.3, and an anion gap less than or equal to 12 mEq/L.  -Once DKA resolves, start a carb consistent diet and start Admelog 4 units TID pre-meal. HOLD IF NPO. Also switch admelog correction scale to low dose Admelog correction scale qac and separate low dose Admelog correction scale qhs. Fingerstick BG before meals and bedtime.  -Goal -180 on the floors  -RD consult  Discharge plan:  -Likely to discharge patient home on GLP-1 agonist and metformin with or without basal insulin. Once DKA resolves, can check C-peptide. Final regimen pending clinical course.  -Patient will need education on how to self-administer GLP-1 agonist.   -Recommend routine outpatient ophthalmology and endocrinology f/u. Can f/u with Dr. Fiore for Endocrine.    HTN  -Outpatient goal BP <130/80. Management per primary team.    HLD  -Would likely benefit from a statin if no contraindication  -Can check lipid profile if not done recently    Discussed with primary team.    Lito Damon DO, Endocrinology Fellow  For follow-up questions, discharge recommendations, or new consults please call answering service at [phone] (weekdays), [phone] (nights/weekends). For nonurgent matters, please email [email or web address] or [email or web address]. 62 year old man with history of DM2 on metformin ertugliflozin, gout on febuxostat, HTN on metoprolol and Valsartan-HCTZ presenting with burning with urination since Thursday (12/22). In the ED, patient had hypotension to 81/59 with HR of 89, temp of 100F, saturating well on RA. No leukocytosis on labs, hemoglobin of 10.2. Hyponatremic down to 122 with elevated glucose to 351, Cr elevated to 2.16. AG of 18 with elevated BHB, and lactate, non-acidotic on VBG. Small ketones, negative nitrite, moderate leuk esterase on UA. CXR clear. Patient received 3L IVF, was given dose of 1g ceftriaxone. Was not started on an insulin drip. Endocrine consulted for DM2 and DKA.    Poorly controlled T2DM with hyperglycemia and DKA  DM diagnosis: 15 years ago  Last A1c: 9.0  Endocrinologist: Yesenia Fiore in [facility] DM meds: Steglatro 15mg daily and metformin 1g BID   -Hold oral DM agents while inpatient  -Started Lantus 12 units at 14:47 on 12/27. Continue this q24h. DO NOT HOLD IF NPO.  -Continue Admelog moderate correction scale q4h with q4h DKA labs until DKA resolves. IVF and electrolyte management per primary team. The proposed ADA criteria for resolution of DKA includes a BG <200 mg/dL and two of the following 3 criteria: a serum bicarbonate level greater than or equal to 15 mEq/L, a venous pH >7.3, and an anion gap less than or equal to 12 mEq/L.  -Once DKA resolves, start a carb consistent diet and start Admelog 4 units TID pre-meal. HOLD IF NPO. Also switch admelog correction scale to low dose Admelog correction scale qac and separate low dose Admelog correction scale qhs. Fingerstick BG before meals and bedtime.  -Goal -180 on the floors  -RD consult  Discharge plan:  -Likely to discharge patient home on GLP-1 agonist and metformin with or without basal insulin. Once DKA resolves, can check C-peptide. Final regimen pending clinical course.  -Patient will need education on how to self-administer GLP-1 agonist.   -Recommend routine outpatient ophthalmology and endocrinology f/u. Can f/u with Dr. Fiore for Endocrine.    HTN  -Outpatient goal BP <130/80. Management per primary team.    HLD  -Would likely benefit from a statin if no contraindication  -Can check lipid profile if not done recently    Discussed with primary team.    Lito Damon DO, Endocrinology Fellow  For follow-up questions, discharge recommendations, or new consults please call answering service at [phone] (weekdays), [phone] (nights/weekends). For nonurgent matters, please email [email or web address] or [email or web address]. 62 year old man with history of DM2 on metformin ertugliflozin, gout on febuxostat, HTN on metoprolol and Valsartan-HCTZ presenting with burning with urination since Thursday (12/22). In the ED, patient had hypotension to 81/59 with HR of 89, temp of 100F, saturating well on RA. No leukocytosis on labs, hemoglobin of 10.2. Hyponatremic down to 122 with elevated glucose to 351, Cr elevated to 2.16. AG of 18 with elevated BHB, and lactate, non-acidotic on VBG. Small ketones, negative nitrite, moderate leuk esterase on UA. CXR clear. Patient received 3L IVF, was given dose of 1g ceftriaxone. Was not started on an insulin drip. Endocrine consulted for DM2 and DKA.    Poorly controlled T2DM with hyperglycemia and DKA  DM diagnosis: 15 years ago  Last A1c: 9.0  Endocrinologist: Yesenia Fiore in [facility] DM meds: Steglatro 15mg daily and metformin 1g BID   -Hold oral DM agents while inpatient  -Started Lantus 12 units at 14:47 on 12/27. Continue this q24h. DO NOT HOLD IF NPO.  -Continue Admelog moderate correction scale q4h with q4h DKA labs until DKA resolves. IVF and electrolyte management per primary team. The proposed ADA criteria for resolution of DKA includes a BG <200 mg/dL and two of the following 3 criteria: a serum bicarbonate level greater than or equal to 15 mEq/L, a venous pH >7.3, and an anion gap less than or equal to 12 mEq/L.  -Once DKA resolves, start a carb consistent diet and start Admelog 4 units TID pre-meal. HOLD IF NPO. Also switch admelog correction scale to low dose Admelog correction scale qac and separate low dose Admelog correction scale qhs. Fingerstick BG before meals and bedtime.  -Goal -180 on the floors  -RD consult  Discharge plan:  -Likely to discharge patient home on GLP-1 agonist and metformin with or without basal insulin. Once DKA resolves, can check C-peptide. STOP SGLT2i ON DISCHARGE in setting of DKA and urologic issues. Final regimen pending clinical course.  -Patient will need education on how to self-administer GLP-1 agonist.   -Recommend routine outpatient ophthalmology and endocrinology f/u. Can f/u with Dr. Fiore for Endocrine.    HTN  -Outpatient goal BP <130/80. Management per primary team.    HLD  -Would likely benefit from a statin if no contraindication  -Can check lipid profile if not done recently    Discussed with primary team.    Lito Damon DO, Endocrinology Fellow  For follow-up questions, discharge recommendations, or new consults please call answering service at [phone] (weekdays), [phone] (nights/weekends). For nonurgent matters, please email [email or web address] or [email or web address].

## 2022-12-27 NOTE — H&P ADULT - PROBLEM SELECTOR PLAN 3
- Unknown baseline, pending return call from PCP  - Possibly pre-renal etiology versus ATN from hypotension  - Trend Cr, dose meds by eGFR, avoid nephrotoxic agents  - F/U urine studies - Unknown baseline, pending return call from PCP regarding baseline creatinine on outpatient labs  - Possibly pre-renal etiology versus ATN from hypotension  - Trend Cr, dose meds by eGFR, avoid nephrotoxic agents  - F/U urine studies - Cr 1.21 as per outpatient labs from 8/2022  - Possibly pre-renal etiology versus ATN from hypotension  - Trend Cr, dose meds by eGFR, avoid nephrotoxic agents  - F/U urine studies - Likely due to outpatient cystoscopy done on 12/22, especially given patient endorsement that symptoms started following that procedure  - Had been seen at his PCP on Monday 12/26/22 and had urine studies sent, was started on Cipro 500mg BID  - UA here with moderate leuk esterase, negative nitrite, 13 WBC/hpf  - S/P dose of ceftriaxone in the ED, will continue on ceftriaxone  - F/U outpatient urine culture and culture sent here  - F/U CT A/P for possibility of prostatitis. Unable to do with IV due to NAHEED

## 2022-12-27 NOTE — PATIENT PROFILE ADULT - NSPROPTRIGHTSUPPORTPERSON_GEN_A_NUR
Well child hearing and vision screening    HEARING FREQUENCY:    For conditioning purpose only  Right ear: 40db at 1000Hz: present    Right Ear:    20db at 1000Hz: present  20db at 2000Hz: present  20db at 4000Hz: present  20db at 6000Hz (11 years and older): present    Left Ear:    20db at 6000Hz (11 years and older): present  20db at 4000Hz: present  20db at 2000Hz: present  20db at 1000Hz: present    Right Ear:    25db at 500Hz: present    Left Ear:    25db at 500Hz: present    Hearing Screen:  Pass-- [place] all tones    VISION:  Far vision: Right eye 10/8, Left eye 10/8, with no corrective lens  2.5+ Lens:  passed  Kirill Ruiz MA    
yes

## 2022-12-27 NOTE — PATIENT PROFILE ADULT - FALL HARM RISK - HARM RISK INTERVENTIONS

## 2022-12-27 NOTE — ED PROVIDER NOTE - ATTENDING CONTRIBUTION TO CARE
I have personally performed a face to face medical and diagnostic evaluation of the patient. I have discussed with and reviewed the Resident's note and agree with the History, ROS, Physical Exam and MDM unless otherwise indicated. A brief summary of my personal evaluation and impression can be found below.    62-year-old male with past medical history of diabetes on multiple medications including an SLGT 2 inhibitor, hypertension presenting with chief complaint of hyperglycemia for the past few days.  Patient does endorse polydipsia and polyuria, and chills.  States that he was started on a medication by his PCP just yesterday for possible UTI–ciprofloxacin.  Patient denies any new cough or URI symptoms.  No known sick contacts.  Patient states that he is occasionally having episodes of pain that shoots up his back and neck and radiates to the head but currently no pain.  No nausea, vomiting, diarrhea, or abdominal pain.  Has been taking all his medications as prescribed but has not taken any of his medications yet today.  On exam, mild tachycardia and oral temp of 100 Fahrenheit, hypotensive systolic 80.  Hyperglycemic to the 300s..  Concern for underlying infection.  Possible DKA versus HHS.  Given patient is on an SLG T possible medication, possible using this DKA.  Patient will need a broad work-up to identify underlying cause of symptoms.  Plan for labs and imaging.  Will reassess to dispo.  Will need admission. Jose Shields, ED Attending

## 2022-12-27 NOTE — CONSULT NOTE ADULT - SUBJECTIVE AND OBJECTIVE BOX
HPI:  62 year old man with history of DM2 on metformin ertugliflozin, gout on febuxostat, HTN on metoprolol and Valsartan-HCTZ presenting with burning with urination since Thursday (12/22). Per patient he had had chronic lower back pain that was thought be due to a kidney stone, so he underwent cystoscopy on 12/22 and per patient following the procedure he started to have burning with urination. He also endorsed having fevers and chills, with Tmax of 102.2F, for which ibuprofen was helping him. He went to his PCP on Monday 12/26, had urine studies sent and was started on Cipro 500mg BID, for which he took one day's worth. Patient now presents for worsening of his symptoms and for episode of low blood pressure for which he said was 81/61. Patient denied chest pain, shortness of breath. Endorsed that his appetite had been gone, but that due to one of his diabetes medications was still making sure to take in a lot of water. He denied nausea, vomiting, diarrhea, constipation. No endorsed discharge from his penis.     In the ED, patient had hypotension to 81/59 with HR of 89, temp of 100F, saturating well on RA. No leukocytosis on labs, hemoglobin of 10.2. Hyponatremic down to 122 with elevated glucose to 351, Cr elevated to 2.16. AG of 18 with elevated BHB, and lactate, non-acidotic on VBG. Small ketones, negative nitrite, moderate leuk esterase on UA. CXR clear. Patient received 3L IVF, was given dose of 1g ceftriaxone (27 Dec 2022 13:11)    Endocrine consulted for DM2 and DKA.    DM diagnosis: 15 years ago  Last A1c: 9.0  Endocrinologist: Yesenia Fiore in [facility] DM meds: Steglatro 15mg daily and metformin 1g BID  Microvascular complications: None  Macrovascular complications: None  SMBG: Fasting -185, no lows  Symptoms: No high BG sxs  Diet at home: Tries to limit carbs  Appetite in the hospital: NPO but is hungry/thirsty  PMHx: As above. No known hx of HF, pancreatitis or thyroid cancer.  ROS positive for fevers, chills, dysuria.    Has elevated lactate, low bicarb, elevated AG, , BHB 2.3. Advised primary team to give Lantus 12 units stat and moderate scale admelog correction and repeat DKA labs 2 hours after. MICU likely not to accept patient so will likely be managed on the floors with subQ insulin.      PAST MEDICAL & SURGICAL HISTORY:  Diabetes type 2      Hypertension      Gout          FAMILY HISTORY: No known FHx of thyroid cancer.     Parents and brothers with DM.      Social History: No etoh or tobacco use    Outpatient Medications: Home Medications:  CIPROFLOXACN 500MG TAB:  (27 Dec 2022 13:02)  FEBUXOSTAT 80MG TAB:  (27 Dec 2022 13:02)  METOPROL SUC 100MG ER TAB:  (27 Dec 2022 13:02)  STEGLATRO 15 MG TABLET:  (27 Dec 2022 13:02)  VALSARTN -12.5 TAB:  (27 Dec 2022 13:02)      MEDICATIONS  (STANDING):  cefTRIAXone   IVPB 1000 milliGRAM(s) IV Intermittent every 24 hours  dextrose 5% + sodium chloride 0.9%. 1000 milliLiter(s) (100 mL/Hr) IV Continuous <Continuous>  dextrose 5%. 1000 milliLiter(s) (100 mL/Hr) IV Continuous <Continuous>  dextrose 5%. 1000 milliLiter(s) (50 mL/Hr) IV Continuous <Continuous>  dextrose 50% Injectable 25 Gram(s) IV Push once  dextrose 50% Injectable 12.5 Gram(s) IV Push once  dextrose 50% Injectable 25 Gram(s) IV Push once  febuxostat 80 milliGRAM(s) Oral daily  glucagon  Injectable 1 milliGRAM(s) IntraMuscular once  heparin   Injectable 5000 Unit(s) SubCutaneous every 8 hours  insulin lispro (ADMELOG) corrective regimen sliding scale   SubCutaneous every 4 hours    MEDICATIONS  (PRN):  dextrose Oral Gel 15 Gram(s) Oral once PRN Blood Glucose LESS THAN 70 milliGRAM(s)/deciliter      Allergies    No Known Allergies    Intolerances      Review of Systems:  Constitutional:  + fever, + chills.  Eye:  No eye pain, No blurring.   Ear/Nose/Mouth/Throat:  No nasal congestion, No sore throat.   Respiratory:  No shortness of breath, No cough.   Cardiovascular:  No chest pain, No palpitations.   Gastrointestinal:  No nausea, No vomiting, No diarrhea.   Genitourinary:  + dysuria, No hematuria.   Endocrine:  No excessive thirst, No polyuria.   Musculoskeletal:  No back pain, No decreased range of motion.   Integumentary:  No rash, No skin lesion.   Neurologic:  Alert and oriented X4, No confusion, No numbness, No tingling.   Additional ROS reviewed and negative except as indicated in HPI.        PHYSICAL EXAM:  VITALS: T(C): 37.7 (12-27-22 @ 16:48)  T(F): 99.9 (12-27-22 @ 16:48), Max: 100 (12-27-22 @ 05:32)  HR: 90 (12-27-22 @ 16:48) (79 - 99)  BP: 118/82 (12-27-22 @ 16:48) (81/59 - 119/72)  RR:  (16 - 20)  SpO2:  (99% - 100%)  Wt(kg): --  General: Well-developed male, No acute distress, Speaking full sentences.   Eye:  Extraocular movements are intact, No proptosis or lid lag.   HENT:  Normocephalic.   Neck:  Supple, Non-tender.   Respiratory:  Respirations are non-labored, Symmetric chest wall expansion, Breath sounds are equal.   Cardiovascular:  tachycardic, Regular rhythm, No edema.  Gastrointestinal:  Soft, Non-tender, Non-distended.   Musculoskeletal:  Normal range of motion, No gross joint swelling.   Feet:  Normal by visual exam, Normal pulses, No ulcers.   Integumentary:  Warm, dry.  Mental Status Exam:  Orientedx4, Speech clear and coherent.   Neurologic:  Alert, Orientedx4, Normal motor function, No focal deficits, Cranial Nerves II-XII are grossly intact bilaterally.   Psychiatric:  Cooperative, Appropriate mood & affect.    POCT Blood Glucose.: 284 mg/dL (12-27-22 @ 17:48)  POCT Blood Glucose.: 276 mg/dL (12-27-22 @ 16:58)  POCT Blood Glucose.: 275 mg/dL (12-27-22 @ 14:44)  POCT Blood Glucose.: 249 mg/dL (12-27-22 @ 13:54)  POCT Blood Glucose.: 329 mg/dL (12-27-22 @ 05:35)                            10.2   5.67  )-----------( 133      ( 27 Dec 2022 06:25 )             32.5       12-27    127<L>  |  93<L>  |  41<H>  ----------------------------<  320<H>  3.8   |  18<L>  |  1.95<H>    eGFR: 39<L>    Ca    8.1<L>      12-27  Mg     1.40     12-27  Phos  2.1     12-27    TPro  6.0  /  Alb  2.9<L>  /  TBili  1.1  /  DBili  x   /  AST  19  /  ALT  19  /  AlkPhos  90  12-27      Thyroid Function Tests:              Radiology:

## 2022-12-27 NOTE — H&P ADULT - PROBLEM SELECTOR PLAN 8
- C/W home febuxostat 80mg daily - On Metformin and Ertugliflozin at home. Holding iso of hospitalization and DKA  - Treating for DKA currently, 12U Lantus and then moderate correctional admelog q4hr  - Endo following  - Last A1C in 8/2022 per outpatient record: 9.2

## 2022-12-27 NOTE — H&P ADULT - PROBLEM SELECTOR PLAN 6
- On Metformin and Ertugliflozin at home. Holding  - Treating for DKA currently, once resolved will determine insulin needs  - Endo following - On Metformin and Ertugliflozin at home. Holding iso of hospitalization and DKA  - Treating for DKA currently, 12U Lantus and then moderate correctional admelog q4hr  - Endo following - On Metformin and Ertugliflozin at home. Holding iso of hospitalization and DKA  - Treating for DKA currently, 12U Lantus and then moderate correctional admelog q4hr  - Endo following  - Last A1C in 8/2022 per outpatient record: 9.2 - Likely iso sepsis   - Will continue to monitor

## 2022-12-27 NOTE — ED PROVIDER NOTE - OBJECTIVE STATEMENT
Patient is a 62y M PMHx DM (Sitagliptin, Febuxostat, Steglatro), HTN, p/w hyperglycemia, chills. x3-4 days patient has had intermittent chills, shaking. Blood sugar has been running high. Patient states when he gets chills he becomes SOB while it lasts. Patient had a recent cystoscopy and has been taking ciprofloxacin. Denies CP, neck pain, nvd, abdominal pain.

## 2022-12-27 NOTE — ED PROVIDER NOTE - ADMIT DISPOSITION PRESENT ON ADMISSION SEPSIS Q1 - RE-EVALUATED PATIENT FLUID AND VITAL SIGNS
Pediatric Sick Visit      Chief Complaint   Patient presents with   • Vomiting     once this morning        SUBJECTIVE:  Jc Torres is a 13 year old male.  Patient presents with Mother.    Preferred method of results communication:     Cell Phone:   Telephone Information:   Mobile [phone]     Okay to leave a message containing results? Yes    HISTORY:   No birth history on file.       MEDICATIONS:  Current Medications    No medications on file        ALLERGIES:     ALLERGIES:   Allergen Reactions   • Penicillins HIVES         OBJECTIVE:  PAST HISTORIES:  Allergies, Medications, Medical history, Surgical history, Family history reviewed and updated.    Family History   Problem Relation Age of Onset   • Diabetes Mother    • Asthma Father         Exercise induced   • Asthma Brother         Exercise induced   • Early death Neg Hx    • Hyperlipidemia Neg Hx         RECENT HEALTH EVENTS:  Illnesses: None.  Hospitalizations: None.  Injuries or Accidents: None.     HPI:  This AM 6:30 woke w vomitting.   Pt. w stomach pain 30 min to 1 hour before vomiting, then SA relieved for about 30 min- 1 hour.   UOP X 2 today, Stool nl.   In person school.   At home no one sick at this moment.       ROS:  Review of Systems   Constitutional: Negative for activity change, appetite change, irritability and unexpected weight change.   HENT: Negative for ear discharge, ear pain, facial swelling, mouth sores, rhinorrhea, sinus pressure, sore throat, trouble swallowing and voice change.    Eyes: Negative for photophobia, pain, discharge and visual disturbance.   Respiratory: Negative for cough, chest tightness, shortness of breath, wheezing and stridor.    Cardiovascular: Negative for chest pain, palpitations and leg swelling.   Gastrointestinal: Positive for abdominal pain and vomiting. Negative for abdominal distention, blood in stool, constipation, diarrhea and nausea.   Endocrine: Negative for cold intolerance, heat  intolerance and polyuria.   Genitourinary: Negative for difficulty urinating, dysuria, enuresis, flank pain, frequency, hematuria and urgency.   Musculoskeletal: Negative for arthralgias, back pain, gait problem, joint swelling, myalgias, neck pain and neck stiffness.   Skin: Negative for pallor, rash and wound.   Allergic/Immunologic: Negative for environmental allergies, food allergies and immunocompromised state.   Neurological: Negative for dizziness, tremors, syncope, facial asymmetry, speech difficulty, weakness, light-headedness, numbness and headaches.   Hematological: Negative for adenopathy. Does not bruise/bleed easily.   Psychiatric/Behavioral: Negative for agitation, behavioral problems, decreased concentration, dysphoric mood, hallucinations, self-injury, sleep disturbance and suicidal ideas. The patient is not nervous/anxious and is not hyperactive.      All other systems reviewed and are negative.     PHYSICAL:     Visit Vitals  Pulse (!) 63   Temp 98 °F (36.7 °C) (Temporal)   Wt 42.6 kg (94 lb)   SpO2 98%     77 %ile (Z= 0.75) based on [facility] (Boys, 2-20 Years) weight-for-age data using vitals from 2/25/2021.  Physical Exam  Constitutional:       General: He is active.   HENT:      Head: Normocephalic.      Right Ear: Tympanic membrane normal.      Left Ear: Tympanic membrane normal.      Nose: Nose normal.      Mouth/Throat:      Mouth: Mucous membranes are moist.      Pharynx: Oropharynx is clear.      Comments: O/p erythema  Eyes:      Extraocular Movements: Extraocular movements intact.      Conjunctiva/sclera: Conjunctivae normal.      Pupils: Pupils are equal, round, and reactive to light.   Neck:      Musculoskeletal: Normal range of motion.   Cardiovascular:      Rate and Rhythm: Normal rate and regular rhythm.      Pulses: Normal pulses.   Pulmonary:      Effort: Pulmonary effort is normal.      Breath sounds: Normal breath sounds.   Abdominal:      General: Abdomen is flat.      Palpations:  Abdomen is soft.   Musculoskeletal: Normal range of motion.   Skin:     General: Skin is warm.      Capillary Refill: Capillary refill takes less than 2 seconds.   Neurological:      General: No focal deficit present.      Mental Status: He is alert.   Psychiatric:         Mood and Affect: Mood normal.         ASSESSMENT:   13 year old male      (R11.10) Vomiting, intractability of vomiting not specified, presence of nausea not specified, unspecified vomiting type  (primary encounter diagnosis)  Plan: POCT RAPID STREP A, STREPTOCOCCUS GROUP A         (STREPTOCOCCUS PYOGENES), BACTERIAL CULTURE,         POCT SARS-COV-2 ANTIGEN, ondansetron (ZOFRAN)         Tab 4 mg       PLAN:  1. All parental concerns and questions discussed.  2. Continue Supportive care, maintain hydration. Return to clinic for new/worsening symptoms including but not limited to fever >/= 101F for 3+ days, decreasing po or uo, resp distress. Parents educated on signs/symptoms requiring immediate medical attention.       The patient and parent indicated understanding of the diagnosis and agreed with the plan of care. All questions answered.      Follow Up: Return if symptoms worsen or fail to improve.    ORDERS:  Diagnoses and all orders for this visit:  Vomiting, intractability of vomiting not specified, presence of nausea not specified, unspecified vomiting type  -     POCT RAPID STREP A  -     STREPTOCOCCUS GROUP A (STREPTOCOCCUS PYOGENES), BACTERIAL CULTURE  -     POCT SARS-COV-2 ANTIGEN  -     ondansetron (ZOFRAN) Tab 4 mg      Aminah Darby MD              I have re-evaluated the patient's fluid status and reviewed vital signs. Clinical perfusion assessment was performed.

## 2022-12-27 NOTE — ED ADULT TRIAGE NOTE - CHIEF COMPLAINT QUOTE
Pt c/o general malaise, headache, hyperglycemia, polyuria and polydipsia, dizziness, chills x 5 days. Pt has pmh of DM, htn.  pt states he's compliant with his medications. pt noted to be  hypotensive. No complaints of chest pain,  nausea,  vomiting  SOB, fever, verbalized..

## 2022-12-27 NOTE — H&P ADULT - PROBLEM SELECTOR PLAN 2
- Patient with history of DMII on home metformin, ertugliflozin  - Found to have elevated anion gap, glucose, BHB, small ketones on urinalysis, decreased bicarb, with borderline pH on VBG  - Endo consulted, MICU consulted. F/U recommendations  - S/P 3L IVF and on maintenance fluids  - Started on insulin drip per DKA protocol  - Q1hr fingersticks  - BMP q4  - Replete electrolytes as needed  - D5 to fluids when fingerstick at or below 250  - NPO - Patient with history of DMII on home metformin, ertugliflozin  - Found to have elevated anion gap, glucose, BHB, small ketones on urinalysis, decreased bicarb, with borderline pH on VBG  - Patient endorses being compliant with home meds, no endorsement of chest pain and EKG appears non-ischemic, could possibly be in setting of his likely UTI  - Endo consulted, MICU consulted  - S/P 3L IVF and on maintenance fluids  - Per endo, lantus 12U, with moderate admelog correction q4hr. F/U repeat BMP, VBG, BHB in 2 hours after lantus   - Q1hr fingersticks  - BMP, VBG, BHB q4  - Replete electrolytes as needed  - D5 to fluids when fingerstick at or below 250  - NPO for now

## 2022-12-27 NOTE — CONSULT NOTE ADULT - TIME BILLING
- Review of records, telemetry, vital signs and daily labs.   - General and cardiovascular physical examination.  - Generation of cardiovascular treatment plan.  - Coordination of care.      Patient was seen and examined by me on 12/27/22,interim events noted,labs and radiology studies reviewed.  Edison Huston MD,FACC.  [address] [place], [place] [id number].  [phone]
reviewing chart and coordinating care with primary team/staff, as well as reviewing vitals, radiology, medication list, recent labs, and prior records.

## 2022-12-27 NOTE — CONSULT NOTE ADULT - ATTENDING COMMENTS
62 M with DM2, gout, htn, s/p cystoscopy a few days ago and now here with urinary discomfort and found to have concern for UTI, HAGMA.  MICU consulted for ketosis.    Suspect patient has starvation ketosis as he hasn't eaten in a few days.  Feels better, and is feeling hungry and thirsty.    - agree with basal/bolus and no insulin gtt  - c/w IVF boluses   - agree with abx for possible prostatitis or UTI    Patient does not require MICU level of care at this time.  Please re-consult as needed.
DM2 with DKA in setting of SGLT2i use, possible UTI.  Initially recommended insulin drip but declined per micu.  Instead gave Lantus and correction scale. Trend DKA labs, IV fluids.  WIll follow. Endocrine team consulted for uncontrolled diabetes. Patient is high risk with high level decision making due to uncontrolled diabetes which places patient at high risk for cardiovascular and cerebrovascular events. Patient with lability of glucose requiring close monitoring and insulin adjustments.    Jessica Miles MD  Division of Endocrinology  Pager: [phone]    If after 6PM or before 9AM, or on weekends/holidays, please call endocrine answering service for assistance ([phone]).  For nonurgent matters email [email or web address] for assistance.

## 2022-12-27 NOTE — H&P ADULT - ASSESSMENT
62 year old man with history of DMII on metformin ertugliflozin, gout on febuxostat, HTN on metoprolol and Valsartan-HCTZ presenting with burning with urination since Thursday (12/22). Had cystoscopy 12/22 with start of symptoms following with moderate Leuk esterase on urinalysis but negative nitrite, 13 WBC/hpf, concerning for UTI versus prostatitis. Course complicated by DKA with elevated anion gap, glucose, BHB, small ketones on urinalysis, decreased bicarb, borderline pH on VBG on urinalysis. 62 year old man with history of DMII on metformin ertugliflozin, gout on febuxostat, HTN on metoprolol and Valsartan-HCTZ presenting with burning with urination since Thursday (12/22). Had cystoscopy 12/22 with start of symptoms following with moderate Leuk esterase on urinalysis but negative nitrite, 13 WBC/hpf, concerning for UTI versus prostatitis. Admitted for sepsis due to likely urinary source with course complicated by DKA with elevated anion gap, glucose, BHB, small ketones on urinalysis, decreased bicarb, borderline pH on VBG on urinalysis.

## 2022-12-27 NOTE — ED ADULT NURSE REASSESSMENT NOTE - NS ED NURSE REASSESS COMMENT FT1
Called made to medicine team to verify insulin drip and IVP 8 units w/ a FS of 249 - pt is a&ox4 AOB - as pe medicine team continue w/ cynthia melvin rn

## 2022-12-27 NOTE — CONSULT NOTE ADULT - ASSESSMENT
62 year old man with history of DMII on metformin ertugliflozin, gout on febuxostat, HTN on metoprolol and Valsartan-HCTZ presenting with burning with urination since Thursday (12/22). Had cystoscopy 12/22 with start of symptoms following with moderate Leuk esterase on urinalysis but negative nitrite, 13 WBC/hpf, concerning for UTI versus prostatitis. Admitted for sepsis due to likely urinary source with course complicated by DKA with elevated anion gap, glucose, BHB, small ketones on urinalysis, decreased bicarb, borderline pH on VBG on urinalysis.        #  Sepsis.   ·  Plan: - Borderline SIRS criteria with his heart rate, had fevers as outpatient, 100F here  - Likely urinary source iso patient's history and data  - Monitor for further fevers  - Hypotensive on admission, resolved with IVF, on maintenance fluids  - F/U urine and blood cultures. Will F/U outpatient urine culture (sent Monday, possible result tomorrow)  - C/W ceftriaxone for now given likely urinary source  - F/U CT A/P for possible prostatitis.        # DKA (diabetic ketoacidosis).   ·  Plan: - Patient with history of DMII on home metformin, ertugliflozin  - Found to have elevated anion gap, glucose, BHB, small ketones on urinalysis, decreased bicarb, with borderline pH on VBG  - Patient endorses being compliant with home meds, no endorsement of chest pain and EKG appears non-ischemic, could possibly be in setting of his likely UTI  - Endo consulted, MICU consulted  - S/P 3L IVF and on maintenance fluids  - Per endo, lantus 12U, with moderate admelog correction q4hr. F/U repeat BMP, VBG, BHB in 2 hours after lantus   - Q1hr fingersticks  - BMP, VBG, BHB q4  - Replete electrolytes as needed  - D5 to fluids when fingerstick at or below 250  - NPO for now.      # Acute UTI.   ·  Plan: - Likely due to outpatient cystoscopy done on 12/22, especially given patient endorsement that symptoms started following that procedure  - Had been seen at his PCP on Monday 12/26/22 and had urine studies sent, was started on Cipro 500mg BID  - UA here with moderate leuk esterase, negative nitrite, 13 WBC/hpf  - S/P dose of ceftriaxone in the ED, will continue on ceftriaxone  - F/U outpatient urine culture and culture sent here  - F/U CT A/P for possibility of prostatitis. Unable to do with IV due to NAHEED.        # NAHEED (acute kidney injury).   ·  Plan: - Cr 1.21 as per outpatient labs from 8/2022  - Possibly pre-renal etiology versus ATN from hypotension  - Trend Cr, dose meds by eGFR, avoid nephrotoxic agents  - F/U urine studies.        # Hyponatremia.   ·  Plan: - 122 on admission, 127 on repeat  - Poor PO intake, but has normal serum osm, hyperglycemia, likely is pseudohyponatremia due to hypertonicity given hyperglycemia  - Sodium corrects to 131 based on glucose level  - Will continue to monitor.        # Thrombocytopenia.   ·  Plan: - Likely iso sepsis   - Will continue to monitor.        #  Anemia.   ·  Plan: - 10.6 on admission  - MCV of ~60  - Possible thalassemia, will F/U with patient.      # DMII (diabetes mellitus, type 2).   ·  Plan: - On Metformin and Ertugliflozin at home. Holding iso of hospitalization and DKA  - Treating for DKA currently, 12U Lantus and then moderate correctional admelog q4hr  - Endo following  - Last A1C in 8/2022 per outpatient record: 9.2.        # Hypertension.   ·  Plan: - On valsartan-HCTZ and metoprolol at home  - Holding iso episodes of hypotension, and NAHEED  - likely 2/2 sepssis  - Continue to monitor BPs.

## 2022-12-27 NOTE — H&P ADULT - NSHPPHYSICALEXAM_GEN_ALL_CORE
Vital Signs Last 24 Hrs  T(C): 36.9 (27 Dec 2022 08:35), Max: 37.8 (27 Dec 2022 05:32)  T(F): 98.4 (27 Dec 2022 08:35), Max: 100 (27 Dec 2022 05:32)  HR: 80 (27 Dec 2022 08:35) (80 - 99)  BP: 96/65 (27 Dec 2022 08:35) (81/59 - 98/70)  BP(mean): 80 (27 Dec 2022 06:54) (72 - 80)  RR: 18 (27 Dec 2022 08:35) (16 - 20)  SpO2: 99% (27 Dec 2022 08:35) (99% - 100%)    Parameters below as of 27 Dec 2022 08:35  Patient On (Oxygen Delivery Method): room air    CONSTITUTIONAL: Uncomfortable appearing  HEENT: Moist oral mucosa, no pharyngeal injection or exudates  RESPIRATORY: Normal respiratory effort, lungs are clear to auscultation bilaterally  CARDIOVASCULAR: Regular rate and rhythm, normal S1 and S2, no murmur/rub/gallop, no lower extremity edema, peripheral pulses are palpable  ABDOMEN: Soft, nondistended, nontender to palpation, normoactive bowel sounds, no rebound/guarding  PSYCH: A+O to person, place, and time  NEUROLOGY: Facial expression symmetric, no gross sensory deficits appreciated, moves all extremities spontaneously  SKIN: No rashes, no palpable lesions

## 2022-12-27 NOTE — H&P ADULT - NSHPREVIEWOFSYSTEMS_GEN_ALL_CORE
Constitutional/General: (X) NEGATIVE, ( ) Acute distress   Eyes: (X) NEGATIVE, ( ) Changes in vision, ( ) double vision, ( ) blurry vision  ENT: (X) NEGATIVE, ( ) Nasal congestion or rhinorrhea, ( ) changes in hearing, ( ) hearing aids. ( ) odynophagia or dysphagia   Skin: (X) NEGATIVE, ( ) Rashes  Cardiovascular: (X) NEGATIVE, ( ) Chest pain, ( ) heart palpitations, ( ) orthopnea/PND  Pulmonary: (X) NEGATIVE, ( ) Shortness of breath, ( ) cough, ( ) pleuritic chest pain, ( ) hemoptysis   Gastrointestinal: (X) NEGATIVE, ( ) Nausea, ( ) vomiting, ( ) diarrhea, ( ) bloating, ( ) constipation, ( ) abdominal pain  Genitourinary: (X) Dysuria, ( ) frequency, ( ) change in urine odor/appearance   Musculoskeletal: (X) NEGATIVE, ( ) Changes in strength, ( ) joint tenderness or swelling  Neurologic: (X) NEGATIVE, ( ) Changes in memory, ( ) headache, ( ) weakness, ( ) paresthesias, ( ) imbalance   Endocrine: (X) NEGATIVE, ( ) Heat/cold intolerance, ( ) weight change, ( ) excessive sweating, ( ) polydipsia/polyuria  Psychology: ( ) Changes in mood, ( ) anxiety, ( ) depression.  Heme/Lymph: ( ) Easy bruising

## 2022-12-27 NOTE — CONSULT NOTE ADULT - ASSESSMENT
ASSESSMENT:  62 year old man with history of DMII on sitagliptin-metformin, gout, HTN presenting with burning with urination x4 days following ureteroscopy with +UA c/f UTI vs. prostatitis c/b DKA (hyperglycemic to 300s with BHB 2.3, borderline VBG). MICU consulted for further management.    Recommendations:  - Continue IVF  - Endocrine aware, f/u recs  - NPO for now  - Patient started on Lantus 12  - q4 moderate Admelog correction scales with q4 DKA labs  - c/w abx for UTI vs. prostatis  - Rest of care per primary team    Patient is not a MICU candidate at this time.    D/w Dr. Candelaria

## 2022-12-28 ENCOUNTER — NON-APPOINTMENT (OUTPATIENT)
Age: 62
End: 2022-12-28

## 2022-12-28 LAB
ANION GAP SERPL CALC-SCNC: 13 MMOL/L — SIGNIFICANT CHANGE UP (ref 7–14)
ANION GAP SERPL CALC-SCNC: 14 MMOL/L — SIGNIFICANT CHANGE UP (ref 7–14)
ANION GAP SERPL CALC-SCNC: 14 MMOL/L — SIGNIFICANT CHANGE UP (ref 7–14)
ANION GAP SERPL CALC-SCNC: 19 MMOL/L — HIGH (ref 7–14)
B-OH-BUTYR SERPL-SCNC: 0.8 MMOL/L — HIGH (ref 0–0.4)
B-OH-BUTYR SERPL-SCNC: 1.4 MMOL/L — HIGH (ref 0–0.4)
B-OH-BUTYR SERPL-SCNC: 1.4 MMOL/L — HIGH (ref 0–0.4)
B-OH-BUTYR SERPL-SCNC: 2.5 MMOL/L — HIGH (ref 0–0.4)
BASE EXCESS BLDV CALC-SCNC: -1.7 MMOL/L — SIGNIFICANT CHANGE UP (ref -2–3)
BASE EXCESS BLDV CALC-SCNC: -1.9 MMOL/L — SIGNIFICANT CHANGE UP (ref -2–3)
BASE EXCESS BLDV CALC-SCNC: -3.1 MMOL/L — LOW (ref -2–3)
BASE EXCESS BLDV CALC-SCNC: -5.6 MMOL/L — LOW (ref -2–3)
BASOPHILS # BLD AUTO: 0.02 K/UL — SIGNIFICANT CHANGE UP (ref 0–0.2)
BASOPHILS NFR BLD AUTO: 0.5 % — SIGNIFICANT CHANGE UP (ref 0–2)
BLOOD GAS VENOUS COMPREHENSIVE RESULT: SIGNIFICANT CHANGE UP
BUN SERPL-MCNC: 23 MG/DL — SIGNIFICANT CHANGE UP (ref 7–23)
BUN SERPL-MCNC: 26 MG/DL — HIGH (ref 7–23)
BUN SERPL-MCNC: 28 MG/DL — HIGH (ref 7–23)
BUN SERPL-MCNC: 29 MG/DL — HIGH (ref 7–23)
CALCIUM SERPL-MCNC: 8.6 MG/DL — SIGNIFICANT CHANGE UP (ref 8.4–10.5)
CALCIUM SERPL-MCNC: 8.6 MG/DL — SIGNIFICANT CHANGE UP (ref 8.4–10.5)
CALCIUM SERPL-MCNC: 9 MG/DL — SIGNIFICANT CHANGE UP (ref 8.4–10.5)
CALCIUM SERPL-MCNC: 9.2 MG/DL — SIGNIFICANT CHANGE UP (ref 8.4–10.5)
CHLORIDE BLDV-SCNC: 98 MMOL/L — SIGNIFICANT CHANGE UP (ref 96–108)
CHLORIDE BLDV-SCNC: 99 MMOL/L — SIGNIFICANT CHANGE UP (ref 96–108)
CHLORIDE SERPL-SCNC: 96 MMOL/L — LOW (ref 98–107)
CHLORIDE SERPL-SCNC: 96 MMOL/L — LOW (ref 98–107)
CHLORIDE SERPL-SCNC: 97 MMOL/L — LOW (ref 98–107)
CHLORIDE SERPL-SCNC: 97 MMOL/L — LOW (ref 98–107)
CO2 BLDV-SCNC: 20.4 MMOL/L — LOW (ref 22–26)
CO2 BLDV-SCNC: 21.9 MMOL/L — LOW (ref 22–26)
CO2 BLDV-SCNC: 23.1 MMOL/L — SIGNIFICANT CHANGE UP (ref 22–26)
CO2 BLDV-SCNC: 24.2 MMOL/L — SIGNIFICANT CHANGE UP (ref 22–26)
CO2 SERPL-SCNC: 18 MMOL/L — LOW (ref 22–31)
CO2 SERPL-SCNC: 20 MMOL/L — LOW (ref 22–31)
CO2 SERPL-SCNC: 21 MMOL/L — LOW (ref 22–31)
CO2 SERPL-SCNC: 22 MMOL/L — SIGNIFICANT CHANGE UP (ref 22–31)
CREAT SERPL-MCNC: 1.22 MG/DL — SIGNIFICANT CHANGE UP (ref 0.5–1.3)
CREAT SERPL-MCNC: 1.32 MG/DL — HIGH (ref 0.5–1.3)
CREAT SERPL-MCNC: 1.37 MG/DL — HIGH (ref 0.5–1.3)
CREAT SERPL-MCNC: 1.43 MG/DL — HIGH (ref 0.5–1.3)
CULTURE RESULTS: SIGNIFICANT CHANGE UP
EGFR: 56 ML/MIN/1.73M2 — LOW
EGFR: 59 ML/MIN/1.73M2 — LOW
EGFR: 62 ML/MIN/1.73M2 — SIGNIFICANT CHANGE UP
EGFR: 68 ML/MIN/1.73M2 — SIGNIFICANT CHANGE UP
EOSINOPHIL # BLD AUTO: 0.02 K/UL — SIGNIFICANT CHANGE UP (ref 0–0.5)
EOSINOPHIL NFR BLD AUTO: 0.5 % — SIGNIFICANT CHANGE UP (ref 0–6)
GAS PNL BLDV: 128 MMOL/L — LOW (ref 136–145)
GAS PNL BLDV: 128 MMOL/L — LOW (ref 136–145)
GAS PNL BLDV: 130 MMOL/L — LOW (ref 136–145)
GAS PNL BLDV: 130 MMOL/L — LOW (ref 136–145)
GAS PNL BLDV: SIGNIFICANT CHANGE UP
GLUCOSE BLDC GLUCOMTR-MCNC: 176 MG/DL — HIGH (ref 70–99)
GLUCOSE BLDC GLUCOMTR-MCNC: 203 MG/DL — HIGH (ref 70–99)
GLUCOSE BLDC GLUCOMTR-MCNC: 207 MG/DL — HIGH (ref 70–99)
GLUCOSE BLDC GLUCOMTR-MCNC: 215 MG/DL — HIGH (ref 70–99)
GLUCOSE BLDC GLUCOMTR-MCNC: 278 MG/DL — HIGH (ref 70–99)
GLUCOSE BLDV-MCNC: 203 MG/DL — HIGH (ref 70–99)
GLUCOSE BLDV-MCNC: 213 MG/DL — HIGH (ref 70–99)
GLUCOSE BLDV-MCNC: 295 MG/DL — HIGH (ref 70–99)
GLUCOSE BLDV-MCNC: 315 MG/DL — HIGH (ref 70–99)
GLUCOSE SERPL-MCNC: 199 MG/DL — HIGH (ref 70–99)
GLUCOSE SERPL-MCNC: 209 MG/DL — HIGH (ref 70–99)
GLUCOSE SERPL-MCNC: 210 MG/DL — HIGH (ref 70–99)
GLUCOSE SERPL-MCNC: 299 MG/DL — HIGH (ref 70–99)
HCO3 BLDV-SCNC: 19 MMOL/L — LOW (ref 22–29)
HCO3 BLDV-SCNC: 21 MMOL/L — LOW (ref 22–29)
HCO3 BLDV-SCNC: 22 MMOL/L — SIGNIFICANT CHANGE UP (ref 22–29)
HCO3 BLDV-SCNC: 23 MMOL/L — SIGNIFICANT CHANGE UP (ref 22–29)
HCT VFR BLD CALC: 35.6 % — LOW (ref 39–50)
HCT VFR BLDA CALC: 32 % — LOW (ref 39–51)
HCT VFR BLDA CALC: 32 % — LOW (ref 39–51)
HCT VFR BLDA CALC: 34 % — LOW (ref 39–51)
HCT VFR BLDA CALC: 36 % — LOW (ref 39–51)
HCV AB S/CO SERPL IA: 0.05 S/CO — SIGNIFICANT CHANGE UP (ref 0–0.99)
HCV AB SERPL-IMP: SIGNIFICANT CHANGE UP
HGB BLD CALC-MCNC: 10.8 G/DL — LOW (ref 13–17)
HGB BLD CALC-MCNC: 10.8 G/DL — LOW (ref 13–17)
HGB BLD CALC-MCNC: 11.3 G/DL — LOW (ref 13–17)
HGB BLD CALC-MCNC: 11.9 G/DL — LOW (ref 13–17)
HGB BLD-MCNC: 11.4 G/DL — LOW (ref 13–17)
IANC: 3.07 K/UL — SIGNIFICANT CHANGE UP (ref 1.8–7.4)
IMM GRANULOCYTES NFR BLD AUTO: 0.3 % — SIGNIFICANT CHANGE UP (ref 0–0.9)
LACTATE BLDV-MCNC: 1.2 MMOL/L — SIGNIFICANT CHANGE UP (ref 0.5–2)
LACTATE BLDV-MCNC: 1.5 MMOL/L — SIGNIFICANT CHANGE UP (ref 0.5–2)
LACTATE BLDV-MCNC: 1.6 MMOL/L — SIGNIFICANT CHANGE UP (ref 0.5–2)
LACTATE BLDV-MCNC: 1.8 MMOL/L — SIGNIFICANT CHANGE UP (ref 0.5–2)
LYMPHOCYTES # BLD AUTO: 0.35 K/UL — LOW (ref 1–3.3)
LYMPHOCYTES # BLD AUTO: 9.3 % — LOW (ref 13–44)
MAGNESIUM SERPL-MCNC: 1.5 MG/DL — LOW (ref 1.6–2.6)
MAGNESIUM SERPL-MCNC: 1.7 MG/DL — SIGNIFICANT CHANGE UP (ref 1.6–2.6)
MAGNESIUM SERPL-MCNC: 1.8 MG/DL — SIGNIFICANT CHANGE UP (ref 1.6–2.6)
MAGNESIUM SERPL-MCNC: 1.8 MG/DL — SIGNIFICANT CHANGE UP (ref 1.6–2.6)
MCHC RBC-ENTMCNC: 19.2 PG — LOW (ref 27–34)
MCHC RBC-ENTMCNC: 32 GM/DL — SIGNIFICANT CHANGE UP (ref 32–36)
MCV RBC AUTO: 59.8 FL — LOW (ref 80–100)
MONOCYTES # BLD AUTO: 0.29 K/UL — SIGNIFICANT CHANGE UP (ref 0–0.9)
MONOCYTES NFR BLD AUTO: 7.7 % — SIGNIFICANT CHANGE UP (ref 2–14)
NEUTROPHILS # BLD AUTO: 3.07 K/UL — SIGNIFICANT CHANGE UP (ref 1.8–7.4)
NEUTROPHILS NFR BLD AUTO: 81.7 % — HIGH (ref 43–77)
NRBC # BLD: 0 /100 WBCS — SIGNIFICANT CHANGE UP (ref 0–0)
NRBC # FLD: 0 K/UL — SIGNIFICANT CHANGE UP (ref 0–0)
PCO2 BLDV: 33 MMHG — LOW (ref 42–55)
PCO2 BLDV: 34 MMHG — LOW (ref 42–55)
PCO2 BLDV: 35 MMHG — LOW (ref 42–55)
PCO2 BLDV: 38 MMHG — LOW (ref 42–55)
PH BLDV: 7.35 — SIGNIFICANT CHANGE UP (ref 7.32–7.43)
PH BLDV: 7.39 — SIGNIFICANT CHANGE UP (ref 7.32–7.43)
PH BLDV: 7.41 — SIGNIFICANT CHANGE UP (ref 7.32–7.43)
PH BLDV: 7.42 — SIGNIFICANT CHANGE UP (ref 7.32–7.43)
PHOSPHATE SERPL-MCNC: 2.2 MG/DL — LOW (ref 2.5–4.5)
PHOSPHATE SERPL-MCNC: 3.3 MG/DL — SIGNIFICANT CHANGE UP (ref 2.5–4.5)
PHOSPHATE SERPL-MCNC: 3.4 MG/DL — SIGNIFICANT CHANGE UP (ref 2.5–4.5)
PHOSPHATE SERPL-MCNC: 4.1 MG/DL — SIGNIFICANT CHANGE UP (ref 2.5–4.5)
PLATELET # BLD AUTO: 155 K/UL — SIGNIFICANT CHANGE UP (ref 150–400)
PO2 BLDV: 28 MMHG — SIGNIFICANT CHANGE UP
PO2 BLDV: 49 MMHG — SIGNIFICANT CHANGE UP
PO2 BLDV: 51 MMHG — SIGNIFICANT CHANGE UP
PO2 BLDV: 64 MMHG — SIGNIFICANT CHANGE UP
POTASSIUM BLDV-SCNC: 4 MMOL/L — SIGNIFICANT CHANGE UP (ref 3.5–5.1)
POTASSIUM BLDV-SCNC: 4.1 MMOL/L — SIGNIFICANT CHANGE UP (ref 3.5–5.1)
POTASSIUM BLDV-SCNC: 4.6 MMOL/L — SIGNIFICANT CHANGE UP (ref 3.5–5.1)
POTASSIUM BLDV-SCNC: 4.6 MMOL/L — SIGNIFICANT CHANGE UP (ref 3.5–5.1)
POTASSIUM SERPL-MCNC: 4.1 MMOL/L — SIGNIFICANT CHANGE UP (ref 3.5–5.3)
POTASSIUM SERPL-MCNC: 4.3 MMOL/L — SIGNIFICANT CHANGE UP (ref 3.5–5.3)
POTASSIUM SERPL-MCNC: 4.6 MMOL/L — SIGNIFICANT CHANGE UP (ref 3.5–5.3)
POTASSIUM SERPL-MCNC: 4.7 MMOL/L — SIGNIFICANT CHANGE UP (ref 3.5–5.3)
POTASSIUM SERPL-SCNC: 4.1 MMOL/L — SIGNIFICANT CHANGE UP (ref 3.5–5.3)
POTASSIUM SERPL-SCNC: 4.3 MMOL/L — SIGNIFICANT CHANGE UP (ref 3.5–5.3)
POTASSIUM SERPL-SCNC: 4.6 MMOL/L — SIGNIFICANT CHANGE UP (ref 3.5–5.3)
POTASSIUM SERPL-SCNC: 4.7 MMOL/L — SIGNIFICANT CHANGE UP (ref 3.5–5.3)
RBC # BLD: 5.95 M/UL — HIGH (ref 4.2–5.8)
RBC # FLD: 17.1 % — HIGH (ref 10.3–14.5)
SAO2 % BLDV: 44 % — SIGNIFICANT CHANGE UP
SAO2 % BLDV: 75.4 % — SIGNIFICANT CHANGE UP
SAO2 % BLDV: 80.1 % — SIGNIFICANT CHANGE UP
SAO2 % BLDV: 91.5 % — SIGNIFICANT CHANGE UP
SODIUM SERPL-SCNC: 130 MMOL/L — LOW (ref 135–145)
SODIUM SERPL-SCNC: 131 MMOL/L — LOW (ref 135–145)
SODIUM SERPL-SCNC: 133 MMOL/L — LOW (ref 135–145)
SODIUM SERPL-SCNC: 133 MMOL/L — LOW (ref 135–145)
SPECIMEN SOURCE: SIGNIFICANT CHANGE UP
WBC # BLD: 3.83 K/UL — SIGNIFICANT CHANGE UP (ref 3.8–10.5)
WBC # FLD AUTO: 3.83 K/UL — SIGNIFICANT CHANGE UP (ref 3.8–10.5)

## 2022-12-28 PROCEDURE — 99232 SBSQ HOSP IP/OBS MODERATE 35: CPT

## 2022-12-28 PROCEDURE — 99233 SBSQ HOSP IP/OBS HIGH 50: CPT | Mod: GC

## 2022-12-28 RX ORDER — INSULIN GLARGINE 100 [IU]/ML
14 INJECTION, SOLUTION SUBCUTANEOUS
Refills: 0 | Status: DISCONTINUED | OUTPATIENT
Start: 2022-12-28 | End: 2022-12-29

## 2022-12-28 RX ORDER — POTASSIUM CHLORIDE 20 MEQ
40 PACKET (EA) ORAL ONCE
Refills: 0 | Status: COMPLETED | OUTPATIENT
Start: 2022-12-28 | End: 2022-12-28

## 2022-12-28 RX ORDER — INSULIN LISPRO 100/ML
VIAL (ML) SUBCUTANEOUS EVERY 6 HOURS
Refills: 0 | Status: DISCONTINUED | OUTPATIENT
Start: 2022-12-28 | End: 2022-12-28

## 2022-12-28 RX ORDER — INSULIN LISPRO 100/ML
VIAL (ML) SUBCUTANEOUS EVERY 4 HOURS
Refills: 0 | Status: DISCONTINUED | OUTPATIENT
Start: 2022-12-28 | End: 2022-12-28

## 2022-12-28 RX ORDER — INSULIN LISPRO 100/ML
VIAL (ML) SUBCUTANEOUS
Refills: 0 | Status: DISCONTINUED | OUTPATIENT
Start: 2022-12-28 | End: 2022-12-29

## 2022-12-28 RX ORDER — INSULIN LISPRO 100/ML
VIAL (ML) SUBCUTANEOUS AT BEDTIME
Refills: 0 | Status: DISCONTINUED | OUTPATIENT
Start: 2022-12-28 | End: 2022-12-31

## 2022-12-28 RX ORDER — SODIUM CHLORIDE 9 MG/ML
1000 INJECTION, SOLUTION INTRAVENOUS
Refills: 0 | Status: DISCONTINUED | OUTPATIENT
Start: 2022-12-28 | End: 2022-12-28

## 2022-12-28 RX ORDER — INSULIN LISPRO 100/ML
6 VIAL (ML) SUBCUTANEOUS
Refills: 0 | Status: DISCONTINUED | OUTPATIENT
Start: 2022-12-28 | End: 2022-12-30

## 2022-12-28 RX ORDER — POTASSIUM PHOSPHATE, MONOBASIC POTASSIUM PHOSPHATE, DIBASIC 236; 224 MG/ML; MG/ML
30 INJECTION, SOLUTION INTRAVENOUS ONCE
Refills: 0 | Status: COMPLETED | OUTPATIENT
Start: 2022-12-28 | End: 2022-12-28

## 2022-12-28 RX ORDER — MAGNESIUM SULFATE 500 MG/ML
2 VIAL (ML) INJECTION ONCE
Refills: 0 | Status: COMPLETED | OUTPATIENT
Start: 2022-12-28 | End: 2022-12-28

## 2022-12-28 RX ADMIN — Medication 2: at 18:17

## 2022-12-28 RX ADMIN — Medication 4: at 09:24

## 2022-12-28 RX ADMIN — Medication 3 MILLIGRAM(S): at 22:16

## 2022-12-28 RX ADMIN — Medication 40 MILLIEQUIVALENT(S): at 14:22

## 2022-12-28 RX ADMIN — HEPARIN SODIUM 5000 UNIT(S): 5000 INJECTION INTRAVENOUS; SUBCUTANEOUS at 14:23

## 2022-12-28 RX ADMIN — Medication 25 GRAM(S): at 18:17

## 2022-12-28 RX ADMIN — POTASSIUM PHOSPHATE, MONOBASIC POTASSIUM PHOSPHATE, DIBASIC 83.33 MILLIMOLE(S): 236; 224 INJECTION, SOLUTION INTRAVENOUS at 09:59

## 2022-12-28 RX ADMIN — Medication 2: at 14:29

## 2022-12-28 RX ADMIN — Medication 4 UNIT(S): at 09:23

## 2022-12-28 RX ADMIN — HEPARIN SODIUM 5000 UNIT(S): 5000 INJECTION INTRAVENOUS; SUBCUTANEOUS at 06:30

## 2022-12-28 RX ADMIN — SODIUM CHLORIDE 100 MILLILITER(S): 9 INJECTION, SOLUTION INTRAVENOUS at 14:24

## 2022-12-28 RX ADMIN — Medication 6 UNIT(S): at 18:16

## 2022-12-28 RX ADMIN — FEBUXOSTAT 80 MILLIGRAM(S): 40 TABLET ORAL at 12:32

## 2022-12-28 RX ADMIN — Medication 1: at 22:15

## 2022-12-28 RX ADMIN — CEFTRIAXONE 100 MILLIGRAM(S): 500 INJECTION, POWDER, FOR SOLUTION INTRAMUSCULAR; INTRAVENOUS at 10:00

## 2022-12-28 RX ADMIN — HEPARIN SODIUM 5000 UNIT(S): 5000 INJECTION INTRAVENOUS; SUBCUTANEOUS at 22:16

## 2022-12-28 RX ADMIN — INSULIN GLARGINE 14 UNIT(S): 100 INJECTION, SOLUTION SUBCUTANEOUS at 14:28

## 2022-12-28 NOTE — PROGRESS NOTE ADULT - SUBJECTIVE AND OBJECTIVE BOX
Chief Complaint: DKA    History: AG opened up again this morning. Made NPO after breakfast with q4h moderate Admelog correction scales and q4h DKA labs. Patient feeling well. Ate dinner last night and breakfast this morning.    MEDICATIONS  (STANDING):  cefTRIAXone   IVPB 1000 milliGRAM(s) IV Intermittent every 24 hours  dextrose 5% + sodium chloride 0.9%. 1000 milliLiter(s) (100 mL/Hr) IV Continuous <Continuous>  dextrose 5%. 1000 milliLiter(s) (100 mL/Hr) IV Continuous <Continuous>  dextrose 5%. 1000 milliLiter(s) (50 mL/Hr) IV Continuous <Continuous>  dextrose 50% Injectable 25 Gram(s) IV Push once  dextrose 50% Injectable 12.5 Gram(s) IV Push once  dextrose 50% Injectable 25 Gram(s) IV Push once  febuxostat 80 milliGRAM(s) Oral daily  glucagon  Injectable 1 milliGRAM(s) IntraMuscular once  heparin   Injectable 5000 Unit(s) SubCutaneous every 8 hours  insulin glargine Injectable (LANTUS) 14 Unit(s) SubCutaneous <User Schedule>  insulin lispro (ADMELOG) corrective regimen sliding scale   SubCutaneous every 4 hours  melatonin 3 milliGRAM(s) Oral at bedtime    MEDICATIONS  (PRN):  dextrose Oral Gel 15 Gram(s) Oral once PRN Blood Glucose LESS THAN 70 milliGRAM(s)/deciliter      PHYSICAL EXAM:  VITALS: T(C): 37.3 (12-28-22 @ 14:36)  T(F): 99.2 (12-28-22 @ 14:36), Max: 99.9 (12-27-22 @ 16:48)  HR: 78 (12-28-22 @ 14:36) (78 - 90)  BP: 138/89 (12-28-22 @ 14:36) (108/71 - 138/89)  RR:  (17 - 18)  SpO2:  (94% - 100%)  Wt(kg): --  General: Well-developed male, No acute distress, Speaking full sentences.   Eye:  Extraocular movements are intact, No proptosis or lid lag, No scleral icterus.   Respiratory:  Respirations are non-labored, Symmetric chest wall expansion.  Cardiovascular:  Normal rate, Regular rhythm, No edema.  Gastrointestinal:  Soft, Non-tender, Non-distended.   Integumentary:  Warm, dry.    POCT Blood Glucose.: 176 mg/dL (12-28-22 @ 14:21)  POCT Blood Glucose.: 207 mg/dL (12-28-22 @ 13:04)  POCT Blood Glucose.: 203 mg/dL (12-28-22 @ 08:46)  POCT Blood Glucose.: 214 mg/dL (12-27-22 @ 22:09)  POCT Blood Glucose.: 286 mg/dL (12-27-22 @ 19:29)  POCT Blood Glucose.: 284 mg/dL (12-27-22 @ 17:48)  POCT Blood Glucose.: 276 mg/dL (12-27-22 @ 16:58)  POCT Blood Glucose.: 275 mg/dL (12-27-22 @ 14:44)  POCT Blood Glucose.: 249 mg/dL (12-27-22 @ 13:54)  POCT Blood Glucose.: 329 mg/dL (12-27-22 @ 05:35)      12-28    133<L>  |  97<L>  |  28<H>  ----------------------------<  210<H>  4.1   |  22  |  1.32<H>    eGFR: 62    Ca    9.0      12-28  Mg     1.70     12-28  Phos  3.4     12-28    TPro  6.0  /  Alb  2.9<L>  /  TBili  1.1  /  DBili  x   /  AST  19  /  ALT  19  /  AlkPhos  90  12-27          Thyroid Function Tests:

## 2022-12-28 NOTE — PROGRESS NOTE ADULT - ATTENDING COMMENTS
61 yo M with DM2 c/b DKA in setting of steglatro use. On basal-bolus insulin, with morning labs today showing worsening AG metabolic acidosis which improved later in the day. Recommend increasing lantus as above and starting bolus insulin with meals as per Dr. Damon's note. Pt cannot be discharged on steglatro.

## 2022-12-28 NOTE — PROGRESS NOTE ADULT - PROBLEM SELECTOR PLAN 1
- Borderline SIRS criteria with his heart rate, had fevers as outpatient, 100F here  - Likely urinary source iso patient's history and data  - Monitor for further fevers  - Hypotensive on admission, resolved with IVF, on maintenance fluids  - F/U urine and blood cultures. Will F/U outpatient urine culture (sent Monday, possible result tomorrow)  - C/W ceftriaxone for now given likely urinary source  - F/U CT A/P for possible prostatitis

## 2022-12-28 NOTE — PROGRESS NOTE ADULT - SUBJECTIVE AND OBJECTIVE BOX
Medicine Progress Note    Jorge Wynn MD  PGY1 Internal Medicine  Available on TEAMS  [facility]: [phone], [facility]: [phone]    Patient is a 62y old  Male who presents with a chief complaint of Sepsis, UTI versus Prostatitis (27 Dec 2022 19:43)      SUBJECTIVE / OVERNIGHT EVENTS: This AM, patient seen and examined at bedside.      MEDICATIONS  (STANDING):  cefTRIAXone   IVPB 1000 milliGRAM(s) IV Intermittent every 24 hours  dextrose 5%. 1000 milliLiter(s) (50 mL/Hr) IV Continuous <Continuous>  dextrose 5%. 1000 milliLiter(s) (100 mL/Hr) IV Continuous <Continuous>  dextrose 50% Injectable 25 Gram(s) IV Push once  dextrose 50% Injectable 12.5 Gram(s) IV Push once  dextrose 50% Injectable 25 Gram(s) IV Push once  febuxostat 80 milliGRAM(s) Oral daily  glucagon  Injectable 1 milliGRAM(s) IntraMuscular once  heparin   Injectable 5000 Unit(s) SubCutaneous every 8 hours  insulin glargine Injectable (LANTUS) 12 Unit(s) SubCutaneous <User Schedule>  insulin lispro (ADMELOG) corrective regimen sliding scale   SubCutaneous at bedtime  insulin lispro (ADMELOG) corrective regimen sliding scale   SubCutaneous three times a day before meals  insulin lispro Injectable (ADMELOG) 4 Unit(s) SubCutaneous three times a day before meals  melatonin 3 milliGRAM(s) Oral at bedtime    MEDICATIONS  (PRN):  dextrose Oral Gel 15 Gram(s) Oral once PRN Blood Glucose LESS THAN 70 milliGRAM(s)/deciliter    CAPILLARY BLOOD GLUCOSE      POCT Blood Glucose.: 214 mg/dL (27 Dec 2022 22:09)  POCT Blood Glucose.: 286 mg/dL (27 Dec 2022 19:29)  POCT Blood Glucose.: 284 mg/dL (27 Dec 2022 17:48)  POCT Blood Glucose.: 276 mg/dL (27 Dec 2022 16:58)  POCT Blood Glucose.: 275 mg/dL (27 Dec 2022 14:44)  POCT Blood Glucose.: 249 mg/dL (27 Dec 2022 13:54)    I&O's Summary    27 Dec 2022 07:01  -  28 Dec 2022 07:00  --------------------------------------------------------  IN: 200 mL / OUT: 400 mL / NET: -200 mL        PHYSICAL EXAM:  Vital Signs Last 24 Hrs  T(C): 37.2 (28 Dec 2022 06:37), Max: 37.7 (27 Dec 2022 16:48)  T(F): 99 (28 Dec 2022 06:37), Max: 99.9 (27 Dec 2022 16:48)  HR: 86 (28 Dec 2022 06:37) (79 - 90)  BP: 113/79 (28 Dec 2022 06:37) (96/65 - 119/72)  BP(mean): --  RR: 18 (28 Dec 2022 06:37) (17 - 18)  SpO2: 97% (28 Dec 2022 06:37) (97% - 100%)    Parameters below as of 28 Dec 2022 06:37  Patient On (Oxygen Delivery Method): room air      CONSTITUTIONAL: NAD, well-developed, well-groomed  HEENT: Moist oral mucosa, no pharyngeal injection or exudates  RESPIRATORY: Normal respiratory effort; lungs are clear to auscultation bilaterally  CARDIOVASCULAR: Regular rate and rhythm, normal S1 and S2, no murmur/rub/gallop, no lower extremity edema, peripheral pulses are 2+ bilaterally  ABDOMEN: Soft, nondistended, nontender to palpation, normoactive bowel sounds, no rebound/guarding  PSYCH: A+O to person, place, and time  NEUROLOGY: Facial expression appears symmetric, no gross sensory deficits appreciated, moving all extremities spontaneously  SKIN: No rashes; no palpable lesions    LABS:                        10.2   5.67  )-----------( 133      ( 27 Dec 2022 06:25 )             32.5     12-27    129<L>  |  96<L>  |  32<H>  ----------------------------<  218<H>  4.1   |  21<L>  |  1.50<H>    Ca    8.6      27 Dec 2022 23:05  Phos  1.6     12-  Mg     1.80         TPro  6.0  /  Alb  2.9<L>  /  TBili  1.1  /  DBili  x   /  AST  19  /  ALT  19  /  AlkPhos  90      23:05 - VBG - pH: 7.42  | pCO2: 33    | pO2: 60    | Lactate: 1.3    18:05 - VBG - pH: 7.44  | pCO2: 28    | pO2: 140   | Lactate: 1.2      PT/INR - ( 27 Dec 2022 06:25 )   PT: 12.7 sec;   INR: 1.09 ratio         PTT - ( 27 Dec 2022 06:25 )  PTT:28.9 sec      Urinalysis Basic - ( 27 Dec 2022 08:10 )    Color: Light Yellow / Appearance: Clear / S.010 / pH: x  Gluc: x / Ketone: Trace  / Bili: Negative / Urobili: <2 mg/dL   Blood: x / Protein: Trace / Nitrite: Negative   Leuk Esterase: Moderate / RBC: 3 /HPF / WBC 13 /HPF   Sq Epi: x / Non Sq Epi: 2 /HPF / Bacteria: Occasional        SARS-CoV-2: NotDetec (27 Dec 2022 06:51)      RADIOLOGY & ADDITIONAL TESTS:  Imaging from Last 24 Hours: Medicine Progress Note    Jorge Wynn MD  PGY1 Internal Medicine  Available on TEAMS  [facility]: [phone], [facility]: [phone]    Patient is a 62y old  Male who presents with a chief complaint of Sepsis, UTI versus Prostatitis (27 Dec 2022 19:43)      SUBJECTIVE / OVERNIGHT EVENTS: AG closed on overnight labs. This AM, patient seen and examined at bedside. Patient endorsed feeling OK this AM. No abdominal pain, no nausea, vomiting, chest pain or shortness of breath.      MEDICATIONS  (STANDING):  cefTRIAXone   IVPB 1000 milliGRAM(s) IV Intermittent every 24 hours  dextrose 5%. 1000 milliLiter(s) (50 mL/Hr) IV Continuous <Continuous>  dextrose 5%. 1000 milliLiter(s) (100 mL/Hr) IV Continuous <Continuous>  dextrose 50% Injectable 25 Gram(s) IV Push once  dextrose 50% Injectable 12.5 Gram(s) IV Push once  dextrose 50% Injectable 25 Gram(s) IV Push once  febuxostat 80 milliGRAM(s) Oral daily  glucagon  Injectable 1 milliGRAM(s) IntraMuscular once  heparin   Injectable 5000 Unit(s) SubCutaneous every 8 hours  insulin glargine Injectable (LANTUS) 12 Unit(s) SubCutaneous <User Schedule>  insulin lispro (ADMELOG) corrective regimen sliding scale   SubCutaneous at bedtime  insulin lispro (ADMELOG) corrective regimen sliding scale   SubCutaneous three times a day before meals  insulin lispro Injectable (ADMELOG) 4 Unit(s) SubCutaneous three times a day before meals  melatonin 3 milliGRAM(s) Oral at bedtime    MEDICATIONS  (PRN):  dextrose Oral Gel 15 Gram(s) Oral once PRN Blood Glucose LESS THAN 70 milliGRAM(s)/deciliter    CAPILLARY BLOOD GLUCOSE      POCT Blood Glucose.: 214 mg/dL (27 Dec 2022 22:09)  POCT Blood Glucose.: 286 mg/dL (27 Dec 2022 19:29)  POCT Blood Glucose.: 284 mg/dL (27 Dec 2022 17:48)  POCT Blood Glucose.: 276 mg/dL (27 Dec 2022 16:58)  POCT Blood Glucose.: 275 mg/dL (27 Dec 2022 14:44)  POCT Blood Glucose.: 249 mg/dL (27 Dec 2022 13:54)    I&O's Summary    27 Dec 2022 07:01  -  28 Dec 2022 07:00  --------------------------------------------------------  IN: 200 mL / OUT: 400 mL / NET: -200 mL        PHYSICAL EXAM:  Vital Signs Last 24 Hrs  T(C): 37.2 (28 Dec 2022 06:37), Max: 37.7 (27 Dec 2022 16:48)  T(F): 99 (28 Dec 2022 06:37), Max: 99.9 (27 Dec 2022 16:48)  HR: 86 (28 Dec 2022 06:37) (79 - 90)  BP: 113/79 (28 Dec 2022 06:37) (96/65 - 119/72)  BP(mean): --  RR: 18 (28 Dec 2022 06:37) (17 - 18)  SpO2: 97% (28 Dec 2022 06:37) (97% - 100%)    Parameters below as of 28 Dec 2022 06:37  Patient On (Oxygen Delivery Method): room air      CONSTITUTIONAL: NAD  HEENT: Moist oral mucosa, no pharyngeal injection or exudates  RESPIRATORY: Normal respiratory effort; lungs are clear to auscultation bilaterally  CARDIOVASCULAR: Regular rate and rhythm, normal S1 and S2, no murmur/rub/gallop, no lower extremity edema, peripheral pulses are 2+ bilaterally  ABDOMEN: Soft, nondistended, nontender to palpation, normoactive bowel sounds, no rebound/guarding  PSYCH: A+O to person, place, and time  NEUROLOGY: Facial expression appears symmetric, no gross sensory deficits appreciated, moving all extremities spontaneously  SKIN: No rashes; no palpable lesions    LABS:                        10.2   5.67  )-----------( 133      ( 27 Dec 2022 06:25 )             32.5     12    129<L>  |  96<L>  |  32<H>  ----------------------------<  218<H>  4.1   |  21<L>  |  1.50<H>    Ca    8.6      27 Dec 2022 23:05  Phos  1.6       Mg     1.80         TPro  6.0  /  Alb  2.9<L>  /  TBili  1.1  /  DBili  x   /  AST  19  /  ALT  19  /  AlkPhos  90      23:05 - VBG - pH: 7.42  | pCO2: 33    | pO2: 60    | Lactate: 1.3    18:05 - VBG - pH: 7.44  | pCO2: 28    | pO2: 140   | Lactate: 1.2      PT/INR - ( 27 Dec 2022 06:25 )   PT: 12.7 sec;   INR: 1.09 ratio         PTT - ( 27 Dec 2022 06:25 )  PTT:28.9 sec      Urinalysis Basic - ( 27 Dec 2022 08:10 )    Color: Light Yellow / Appearance: Clear / S.010 / pH: x  Gluc: x / Ketone: Trace  / Bili: Negative / Urobili: <2 mg/dL   Blood: x / Protein: Trace / Nitrite: Negative   Leuk Esterase: Moderate / RBC: 3 /HPF / WBC 13 /HPF   Sq Epi: x / Non Sq Epi: 2 /HPF / Bacteria: Occasional        SARS-CoV-2: NotDetec (27 Dec 2022 06:51)      RADIOLOGY & ADDITIONAL TESTS:  Imaging from Last 24 Hours:

## 2022-12-28 NOTE — PROGRESS NOTE ADULT - PROBLEM SELECTOR PLAN 5
- 122 on admission, 127 on repeat  - Poor PO intake, but has normal serum osm, hyperglycemia, likely is pseudohyponatremia due to hypertonicity given hyperglycemia  - Sodium corrects to 131 based on glucose level  - Will continue to monitor - 122 on admission, 127 on repeat  - Poor PO intake, but has normal serum osm, hyperglycemia, likely is pseudohyponatremia due to hypertonicity given hyperglycemia  - Sodium correcting above 130 based on glucose levels  - Will continue to monitor

## 2022-12-28 NOTE — PROGRESS NOTE ADULT - PROBLEM SELECTOR PLAN 9
- On valsartan-HCTZ and metoprolol at home  - Holding iso episodes of hypotension, and NAHEED  - Continue to monitor BPs

## 2022-12-28 NOTE — PROGRESS NOTE ADULT - SUBJECTIVE AND OBJECTIVE BOX
PATIENT SEEN AND EXAMINED ON :- 12/28/22  DATE OF SERVICE:   12/28/22          Interim events noted,Labs ,Radiological studies and Cardiology tests reviewed .       HOSPITAL COURSE: HPI:  62 year old man with history of DMII on metformin ertugliflozin, gout on febuxostat, HTN on metoprolol and Valsartan-HCTZ presenting with burning with urination since Thursday (12/22). Per patient he had had chronic lower back pain that was thought be due to a kidney stone, so he underwent cystoscopy on 12/22 and per patient following the procedure he started to have burning with urination. He also endorsed having fevers and chills, with Tmax of 102.2F, for which ibuprofen was helping him. He went to his PCP on Monday 12/26, had urine studies sent and was started on Cipro 500mg BID, for which he took one day's worth. Patient now presents for worsening of his symptoms and for episode of low blood pressure for which he said was 81/61. Patient denied chest pain, shortness of breath. Endorsed that his appetite had been gone, but that due to one of his diabetes medications was still making sure to take in a lot of water. He denied nausea, vomiting, diarrhea, constipation. No endorsed discharge from his penis.     In the ED, patient had hypotension to 81/59 with HR of 89, temp of 100F, saturating well on RA. No leukocytosis on labs, hemoglobin of 10.2. Hyponatremic down to 122 with elevated glucose to 351, Cr elevated to 2.16. AG of 18 with elevated BHB, and lactate, non-acidotic on VBG. Small ketones, negative nitrite, moderate leuk esterase on UA. CXR clear. Patient received 3L IVF, was given dose of 1g ceftriaxone (27 Dec 2022 13:11)      INTERIM EVENTS:Patient seen at bedside ,interim events noted.      PMH -reviewed admission note, no change since admission  HEART FAILURE: Acute[ ]Chronic[ ] Systolic[ ] Diastolic[ ] Combined Systolic and Diastolic[ ]  CAD[ ] CABG[ ] PCI[ ]  DEVICES[ ] PPM[ ] ICD[ ] ILR[ ]  ATRIAL FIBRILLATION[ ] Paroxysmal[ ] Permanent[ ] CHADS2-[  ]  NAHEED[ ] CKD1[ ] CKD2[ ] CKD3[ ] CKD4[ ] ESRD[ ]  COPD[ ] HTN[ ]   DM[ ] Type1[ ] Type 2[ ]   CVA[ ] Paresis[ ]    AMBULATION: Assisted[ ] Cane/walker[ ] Independent[ ]    MEDICATIONS  (STANDING):  cefTRIAXone   IVPB 1000 milliGRAM(s) IV Intermittent every 24 hours  dextrose 5%. 1000 milliLiter(s) (100 mL/Hr) IV Continuous <Continuous>  dextrose 5%. 1000 milliLiter(s) (50 mL/Hr) IV Continuous <Continuous>  dextrose 50% Injectable 25 Gram(s) IV Push once  dextrose 50% Injectable 12.5 Gram(s) IV Push once  dextrose 50% Injectable 25 Gram(s) IV Push once  febuxostat 80 milliGRAM(s) Oral daily  glucagon  Injectable 1 milliGRAM(s) IntraMuscular once  heparin   Injectable 5000 Unit(s) SubCutaneous every 8 hours  insulin glargine Injectable (LANTUS) 14 Unit(s) SubCutaneous <User Schedule>  insulin lispro (ADMELOG) corrective regimen sliding scale   SubCutaneous three times a day before meals  insulin lispro (ADMELOG) corrective regimen sliding scale   SubCutaneous at bedtime  insulin lispro Injectable (ADMELOG) 6 Unit(s) SubCutaneous three times a day before meals  melatonin 3 milliGRAM(s) Oral at bedtime    MEDICATIONS  (PRN):  dextrose Oral Gel 15 Gram(s) Oral once PRN Blood Glucose LESS THAN 70 milliGRAM(s)/deciliter            REVIEW OF SYSTEMS:  Constitutional: [ ] fever, [ ]weight loss,  [ ]fatigue [ ]weight gain  Eyes: [ ] visual changes  Respiratory: [ ]shortness of breath;  [ ] cough, [ ]wheezing, [ ]chills, [ ]hemoptysis  Cardiovascular: [ ] chest pain, [ ]palpitations, [ ]dizziness,  [ ]leg swelling[ ]orthopnea[ ]PND  Gastrointestinal: [ ] abdominal pain, [ ]nausea, [ ]vomiting,  [ ]diarrhea [ ]Constipation [ ]Melena  Genitourinary: [ ] dysuria, [ ] hematuria [ ]Vidal  Neurologic: [ ] headaches [ ] tremors[ ]weakness [ ]Paralysis Right[ ] Left[ ]  Skin: [ ] itching, [ ]burning, [ ] rashes  Endocrine: [ ] heat or cold intolerance  Musculoskeletal: [ ] joint pain or swelling; [ ] muscle, back, or extremity pain  Psychiatric: [ ] depression, [ ]anxiety, [ ]mood swings, or [ ]difficulty sleeping  Hematologic: [ ] easy bruising, [ ] bleeding gums    [ ] All remaining systems negative except as per above.   [ ]Unable to obtain.  [x] No change in ROS since admission      Vital Signs Last 24 Hrs  T(C): 37.3 (28 Dec 2022 14:36), Max: 37.3 (28 Dec 2022 14:36)  T(F): 99.2 (28 Dec 2022 14:36), Max: 99.2 (28 Dec 2022 14:36)  HR: 78 (28 Dec 2022 14:36) (78 - 86)  BP: 138/89 (28 Dec 2022 14:36) (113/79 - 138/89)  BP(mean): --  RR: 18 (28 Dec 2022 14:36) (18 - 18)  SpO2: 94% (28 Dec 2022 14:36) (94% - 97%)    Parameters below as of 28 Dec 2022 14:36  Patient On (Oxygen Delivery Method): room air      I&O's Summary    27 Dec 2022 07:01  -  28 Dec 2022 07:00  --------------------------------------------------------  IN: 200 mL / OUT: 400 mL / NET: -200 mL        PHYSICAL EXAM:  General: No acute distress BMI-  HEENT: EOMI, PERRL  Neck: Supple, [ ] JVD  Lungs: Equal air entry bilaterally; [ ] rales [ ] wheezing [ ] rhonchi  Heart: Regular rate and rhythm; [x ] murmur   2/6 [ x] systolic [ ] diastolic [ ] radiation[ ] rubs [ ]  gallops  Abdomen: Nontender, bowel sounds present  Extremities: No clubbing, cyanosis, [ ] edema [ ]Pulses  equal and intact  Nervous system:  Alert & Oriented X3, no focal deficits  Psychiatric: Normal affect  Skin: No rashes or lesions    LABS:  12-28    130<L>  |  96<L>  |  23  ----------------------------<  299<H>  4.7   |  21<L>  |  1.37<H>    Ca    8.6      28 Dec 2022 19:22  Phos  3.3     12-28  Mg     1.80     12-28    TPro  6.0  /  Alb  2.9<L>  /  TBili  1.1  /  DBili  x   /  AST  19  /  ALT  19  /  AlkPhos  90  12-27    Creatinine Trend: 1.37<--, 1.22<--, 1.32<--, 1.43<--, 1.50<--, 1.60<--                        11.4   3.83  )-----------( 155      ( 28 Dec 2022 07:18 )             35.6     PT/INR - ( 27 Dec 2022 06:25 )   PT: 12.7 sec;   INR: 1.09 ratio         PTT - ( 27 Dec 2022 06:25 )  PTT:28.9 sec

## 2022-12-28 NOTE — PROGRESS NOTE ADULT - PROBLEM SELECTOR PLAN 8
- On Metformin and Ertugliflozin at home. Holding iso of hospitalization and DKA  - Treating for DKA currently, 12U Lantus and then moderate correctional admelog q4hr  - Endo following  - Last A1C in 8/2022 per outpatient record: 9.2

## 2022-12-28 NOTE — PROGRESS NOTE ADULT - ASSESSMENT
62 year old man with history of DMII on metformin ertugliflozin, gout on febuxostat, HTN on metoprolol and Valsartan-HCTZ presenting with burning with urination since Thursday (12/22). Had cystoscopy 12/22 with start of symptoms following with moderate Leuk esterase on urinalysis but negative nitrite, 13 WBC/hpf, concerning for UTI versus prostatitis. Admitted for sepsis due to likely urinary source with course complicated by DKA with elevated anion gap, glucose, BHB, small ketones on urinalysis, decreased bicarb, borderline pH on VBG on urinalysis.

## 2022-12-28 NOTE — PROGRESS NOTE ADULT - ASSESSMENT
62 year old man with history of DM2 on metformin ertugliflozin, gout on febuxostat, HTN on metoprolol and Valsartan-HCTZ presenting with burning with urination since Thursday (12/22). In the ED, patient had hypotension to 81/59 with HR of 89, temp of 100F, saturating well on RA. No leukocytosis on labs, hemoglobin of 10.2. Hyponatremic down to 122 with elevated glucose to 351, Cr elevated to 2.16. AG of 18 with elevated BHB, and lactate, non-acidotic on VBG. Small ketones, negative nitrite, moderate leuk esterase on UA. CXR clear. Patient received 3L IVF, was given dose of 1g ceftriaxone. Was not started on an insulin drip. Endocrine consulted for DM2 and DKA.    Poorly controlled T2DM with hyperglycemia and DKA  DM diagnosis: 15 years ago  Last A1c: 9.0  Endocrinologist: Yesenia Fiore in [employer] DM meds: Steglatro 15mg daily and metformin 1g BID   -Hold oral DM agents while inpatient  -Started Lantus 12 units at 14:47 on 12/27. Increased to 14 units q24h today. DO NOT HOLD IF NPO.  -Continue Admelog moderate correction scale q4h with q4h DKA labs until DKA resolves. IVF and electrolyte management per primary team. The proposed ADA criteria for resolution of DKA includes a BG <200 mg/dL and two of the following 3 criteria: a serum bicarbonate level greater than or equal to 15 mEq/L, a venous pH >7.3, and an anion gap less than or equal to 12 mEq/L.  -Once DKA resolves, start a carb consistent diet and start Admelog 6 units TID pre-meal. HOLD IF NPO. Also switch admelog correction scale to low dose Admelog correction scale qac and separate low dose Admelog correction scale qhs. Fingerstick BG before meals and bedtime.  -Goal -180 on the floors  -RD consult  Discharge plan:  -Likely to discharge patient home on GLP-1 agonist and metformin with or without basal insulin. Once DKA resolves, can check C-peptide. STOP SGLT2i ON DISCHARGE in setting of DKA and urologic issues. Final regimen pending clinical course.  -Patient will need education on how to self-administer GLP-1 agonist.   -Recommend routine outpatient ophthalmology and endocrinology f/u. Can f/u with Dr. Fiore for Endocrine.    HTN  -Outpatient goal BP <130/80. BP well controlled on no HTN meds. Management per primary team.    HLD  -Would likely benefit from a statin if no contraindication  -Can check lipid profile if not done recently    Discussed with primary team.    Lito Damon DO, Endocrinology Fellow  For follow-up questions, discharge recommendations, or new consults please call answering service at [phone] (weekdays), [phone] (nights/weekends). For nonurgent matters, please email [email or web address] or [email or web address].

## 2022-12-28 NOTE — PROGRESS NOTE ADULT - PROBLEM SELECTOR PLAN 4
- Cr 1.21 as per outpatient labs from 8/2022  - Possibly pre-renal etiology versus ATN from hypotension  - Trend Cr, dose meds by eGFR, avoid nephrotoxic agents  - F/U urine studies - Cr 1.21 as per outpatient labs from 8/2022  - Possibly pre-renal etiology versus ATN from hypotension/sepsis  - Trend Cr, dose meds by eGFR, avoid nephrotoxic agents  - Improving back towards baseline

## 2022-12-28 NOTE — PROGRESS NOTE ADULT - PROBLEM SELECTOR PLAN 3
- Likely due to outpatient cystoscopy done on 12/22, especially given patient endorsement that symptoms started following that procedure  - Had been seen at his PCP on Monday 12/26/22 and had urine studies sent, was started on Cipro 500mg BID  - UA here with moderate leuk esterase, negative nitrite, 13 WBC/hpf  - S/P dose of ceftriaxone in the ED, will continue on ceftriaxone  - F/U outpatient urine culture and culture sent here  - F/U CT A/P for possibility of prostatitis. Unable to do with IV due to NAHEED - Likely due to outpatient cystoscopy done on 12/22, especially given patient endorsement that symptoms started following that procedure  - Had been seen at his PCP on Monday 12/26/22 and had urine studies sent, was started on Cipro 500mg BID  - UA here with moderate leuk esterase, negative nitrite, 13 WBC/hpf  - S/P dose of ceftriaxone in the ED, will continue on ceftriaxone  - Urine culture here no growth, likely due to cipro patient was taking  - Outpatient culture growing >100k E.coli, will follow up sensitivities that they get  - F/U CT A/P for possibility of prostatitis. Unable to do with IV due to NAHEED

## 2022-12-28 NOTE — PROGRESS NOTE ADULT - PROBLEM SELECTOR PLAN 2
- Patient with history of DMII on home metformin, ertugliflozin  - Found to have elevated anion gap, glucose, BHB, small ketones on urinalysis, decreased bicarb, with borderline pH on VBG  - Patient endorses being compliant with home meds, no endorsement of chest pain and EKG appears non-ischemic, could possibly be in setting of his likely UTI  - Endo consulted, MICU consulted  - S/P 3L IVF and on maintenance fluids  - Per endo, lantus 12U, with moderate admelog correction q4hr. F/U repeat BMP, VBG, BHB in 2 hours after lantus   - Q1hr fingersticks  - BMP, VBG, BHB q4  - Replete electrolytes as needed  - D5 to fluids when fingerstick at or below 250  - NPO for now - Patient with history of DMII on home metformin, ertugliflozin  - Found to have elevated anion gap, glucose, BHB, small ketones on urinalysis, decreased bicarb, with borderline pH on VBG  - Patient endorses being compliant with home meds, no endorsement of chest pain and EKG appears non-ischemic, could possibly be in setting of his likely UTI  - Endo consulted, MICU consulted  - S/P 3L IVF in ED  - AG had opened up this AM and patient was put back on NPO, 14U lantus and q4hr moderate ISS  - BMP, VBG, BHB q4  - Replete electrolytes as needed  - Pending AG closure for resumption of diet then onto 6U pre-meal with low ISS pre meal and bedtime and 14U lantus

## 2022-12-28 NOTE — PROGRESS NOTE ADULT - ATTENDING COMMENTS
-endo:  NPO, lantus 12 to 14  q4hr BMP vBG  moderate insulin sliding scale    -UCx from PCP    -add differential to CBC -endo:  NPO, lantus 12 to 14  q4hr BMP vBG  moderate insulin sliding scale    -UCx from PCP    -add differential to CBC    -CT Abd/Pelvis Patient seen and examined by me. Case discussed with resident and agree with the resident's findings and plan as documented in the resident's note. 62M h/o DM-II on metformin/ertugliflozin, gout on febuxostat, HTN on metoprolol and Valsartan-HCTZ presenting with burning with urination since Thursday 12/22 s/p cystoscopy 12/21 a/w sepsis 2/2 UTI versus prostatitis course c/b DKA.    1. Sepsis 2/2 UTI vs. prostatitis:  -sepsis resolving  -f/u outpatient PCP urine culture and urine culture in-house  -c/w ceftriaxone IV for now  -monitor fever curve  -f/u CT abd/pelvis without contrast given NAHEED  -Urine culture from 12/27 NGTD   -outpatient PCP urine culture growing >100K Ecoli; will f/u sensitivities    2. Hypotension likely 2/2 sepsis:  -s/p 3L bolus and now NS@100cc/hr  -holding BP meds  -monitor BP closely    3. DKA:  -gap opened again this morning -- patient with elevated anion gap and elevated BHB  -Per endo, make patient NPO, increase lantus 14U with moderate insulin sliding scale; f/u BMP q4hrs and VBG  -Q1hr fingersticks, BMP, VBG, BHB q4 and replete electrolytes as needed  -f/u endo recs  -IbS9v=7.0%; holding home DM meds    4. NAHEED likely 2/2 pre-renal azotemia:  -baseline creatinine 1.21  -f/u urine studies to check FENA    5. Hyponatremia:  -likely pseudohyponatremia 2/2 hyperglycemia  -poor PO intake  -resolving    6. Thrombocytopenia:  -likely 2/2 sepsis  -resolved    7. Anemia:  -check iron studies  -given MCV in the 60s, likely 2/2 thalassemia  -will clarify with patient    8. DVT Px:  -c/w heparin subq tid. Patient seen and examined by me. Case discussed with resident and agree with the resident's findings and plan as documented in the resident's note. 62M h/o DM-II on metformin/ertugliflozin, gout on febuxostat, HTN on metoprolol and Valsartan-HCTZ presenting with burning with urination since Thursday 12/22 s/p cystoscopy 12/21 a/w sepsis 2/2 UTI versus prostatitis course c/b DKA.    1. Sepsis 2/2 UTI vs. prostatitis:  -sepsis resolving  -f/u outpatient PCP urine culture and urine culture in-house  -c/w ceftriaxone IV for now  -monitor fever curve  -f/u CT abd/pelvis without contrast given NAHEED  -Urine culture from 12/27 NGTD   -outpatient PCP urine culture growing >100K Ecoli; will f/u sensitivities    2. Hypotension likely 2/2 sepsis:  -s/p 3L bolus and now NS@100cc/hr  -holding BP meds  -monitor BP closely    3. DKA:  -gap opened again this morning -- patient with elevated anion gap and elevated BHB.  -Per endo, make patient NPO, increase lantus 14U with moderate insulin sliding scale; f/u BMP q4hrs and VBG  -Q1hr fingersticks, BMP, VBG, BHB q4 and replete electrolytes as needed  -f/u endo recs  -AkF1w=8.0%; holding home DM meds    4. NAHEED likely 2/2 pre-renal azotemia:  -baseline creatinine 1.21  -f/u urine studies to check FENA    5. Hyponatremia:  -likely pseudohyponatremia 2/2 hyperglycemia  -poor PO intake  -resolving    6. Thrombocytopenia:  -likely 2/2 sepsis  -resolved    7. Anemia:  -check iron studies  -given MCV in the 60s, likely 2/2 thalassemia  -will clarify with patient    8. DVT Px:  -c/w heparin subq tid.

## 2022-12-29 ENCOUNTER — TRANSCRIPTION ENCOUNTER (OUTPATIENT)
Age: 62
End: 2022-12-29

## 2022-12-29 DIAGNOSIS — R79.89 OTHER SPECIFIED ABNORMAL FINDINGS OF BLOOD CHEMISTRY: ICD-10-CM

## 2022-12-29 LAB
ANION GAP SERPL CALC-SCNC: 11 MMOL/L — SIGNIFICANT CHANGE UP (ref 7–14)
ANION GAP SERPL CALC-SCNC: 16 MMOL/L — HIGH (ref 7–14)
ANION GAP SERPL CALC-SCNC: 17 MMOL/L — HIGH (ref 7–14)
B-OH-BUTYR SERPL-SCNC: 0.8 MMOL/L — HIGH (ref 0–0.4)
B-OH-BUTYR SERPL-SCNC: 0.8 MMOL/L — HIGH (ref 0–0.4)
BASE EXCESS BLDV CALC-SCNC: -2 MMOL/L — SIGNIFICANT CHANGE UP (ref -2–3)
BASE EXCESS BLDV CALC-SCNC: -4.5 MMOL/L — LOW (ref -2–3)
BLOOD GAS VENOUS COMPREHENSIVE RESULT: SIGNIFICANT CHANGE UP
BLOOD GAS VENOUS COMPREHENSIVE RESULT: SIGNIFICANT CHANGE UP
BUN SERPL-MCNC: 22 MG/DL — SIGNIFICANT CHANGE UP (ref 7–23)
BUN SERPL-MCNC: 23 MG/DL — SIGNIFICANT CHANGE UP (ref 7–23)
BUN SERPL-MCNC: 25 MG/DL — HIGH (ref 7–23)
C PEPTIDE SERPL-MCNC: 3.6 NG/ML — SIGNIFICANT CHANGE UP (ref 1.1–4.4)
CALCIUM SERPL-MCNC: 8.8 MG/DL — SIGNIFICANT CHANGE UP (ref 8.4–10.5)
CALCIUM SERPL-MCNC: 9.2 MG/DL — SIGNIFICANT CHANGE UP (ref 8.4–10.5)
CALCIUM SERPL-MCNC: 9.4 MG/DL — SIGNIFICANT CHANGE UP (ref 8.4–10.5)
CHLORIDE BLDV-SCNC: 96 MMOL/L — SIGNIFICANT CHANGE UP (ref 96–108)
CHLORIDE BLDV-SCNC: 98 MMOL/L — SIGNIFICANT CHANGE UP (ref 96–108)
CHLORIDE SERPL-SCNC: 93 MMOL/L — LOW (ref 98–107)
CHLORIDE SERPL-SCNC: 96 MMOL/L — LOW (ref 98–107)
CHLORIDE SERPL-SCNC: 96 MMOL/L — LOW (ref 98–107)
CHLORIDE UR-SCNC: 35 MMOL/L — SIGNIFICANT CHANGE UP
CO2 BLDV-SCNC: 23.3 MMOL/L — SIGNIFICANT CHANGE UP (ref 22–26)
CO2 BLDV-SCNC: 23.5 MMOL/L — SIGNIFICANT CHANGE UP (ref 22–26)
CO2 SERPL-SCNC: 19 MMOL/L — LOW (ref 22–31)
CO2 SERPL-SCNC: 20 MMOL/L — LOW (ref 22–31)
CO2 SERPL-SCNC: 21 MMOL/L — LOW (ref 22–31)
CREAT ?TM UR-MCNC: 39 MG/DL — SIGNIFICANT CHANGE UP
CREAT SERPL-MCNC: 1.21 MG/DL — SIGNIFICANT CHANGE UP (ref 0.5–1.3)
CREAT SERPL-MCNC: 1.37 MG/DL — HIGH (ref 0.5–1.3)
CREAT SERPL-MCNC: 1.38 MG/DL — HIGH (ref 0.5–1.3)
EGFR: 59 ML/MIN/1.73M2 — LOW
EGFR: 59 ML/MIN/1.73M2 — LOW
EGFR: 69 ML/MIN/1.73M2 — SIGNIFICANT CHANGE UP
GAS PNL BLDV: 128 MMOL/L — LOW (ref 136–145)
GAS PNL BLDV: 128 MMOL/L — LOW (ref 136–145)
GAS PNL BLDV: SIGNIFICANT CHANGE UP
GLUCOSE BLDC GLUCOMTR-MCNC: 230 MG/DL — HIGH (ref 70–99)
GLUCOSE BLDC GLUCOMTR-MCNC: 248 MG/DL — HIGH (ref 70–99)
GLUCOSE BLDC GLUCOMTR-MCNC: 273 MG/DL — HIGH (ref 70–99)
GLUCOSE BLDC GLUCOMTR-MCNC: 338 MG/DL — HIGH (ref 70–99)
GLUCOSE BLDV-MCNC: 274 MG/DL — HIGH (ref 70–99)
GLUCOSE BLDV-MCNC: 386 MG/DL — HIGH (ref 70–99)
GLUCOSE SERPL-MCNC: 257 MG/DL — HIGH (ref 70–99)
GLUCOSE SERPL-MCNC: 301 MG/DL — HIGH (ref 70–99)
GLUCOSE SERPL-MCNC: 364 MG/DL — HIGH (ref 70–99)
HCO3 BLDV-SCNC: 22 MMOL/L — SIGNIFICANT CHANGE UP (ref 22–29)
HCO3 BLDV-SCNC: 22 MMOL/L — SIGNIFICANT CHANGE UP (ref 22–29)
HCT VFR BLD CALC: 37.9 % — LOW (ref 39–50)
HCT VFR BLDA CALC: 34 % — LOW (ref 39–51)
HCT VFR BLDA CALC: 36 % — LOW (ref 39–51)
HGB BLD CALC-MCNC: 11.3 G/DL — LOW (ref 13–17)
HGB BLD CALC-MCNC: 12.1 G/DL — LOW (ref 13–17)
HGB BLD-MCNC: 11.9 G/DL — LOW (ref 13–17)
LACTATE BLDV-MCNC: 1.4 MMOL/L — SIGNIFICANT CHANGE UP (ref 0.5–2)
LACTATE BLDV-MCNC: 3.2 MMOL/L — HIGH (ref 0.5–2)
MAGNESIUM SERPL-MCNC: 1.5 MG/DL — LOW (ref 1.6–2.6)
MAGNESIUM SERPL-MCNC: 1.7 MG/DL — SIGNIFICANT CHANGE UP (ref 1.6–2.6)
MAGNESIUM SERPL-MCNC: 1.9 MG/DL — SIGNIFICANT CHANGE UP (ref 1.6–2.6)
MCHC RBC-ENTMCNC: 18.9 PG — LOW (ref 27–34)
MCHC RBC-ENTMCNC: 31.4 GM/DL — LOW (ref 32–36)
MCV RBC AUTO: 60.1 FL — LOW (ref 80–100)
NRBC # BLD: 0 /100 WBCS — SIGNIFICANT CHANGE UP (ref 0–0)
NRBC # FLD: 0 K/UL — SIGNIFICANT CHANGE UP (ref 0–0)
OSMOLALITY UR: 302 MOSM/KG — SIGNIFICANT CHANGE UP (ref 50–1200)
PCO2 BLDV: 35 MMHG — LOW (ref 42–55)
PCO2 BLDV: 46 MMHG — SIGNIFICANT CHANGE UP (ref 42–55)
PH BLDV: 7.29 — LOW (ref 7.32–7.43)
PH BLDV: 7.41 — SIGNIFICANT CHANGE UP (ref 7.32–7.43)
PHOSPHATE SERPL-MCNC: 2.9 MG/DL — SIGNIFICANT CHANGE UP (ref 2.5–4.5)
PHOSPHATE SERPL-MCNC: 3.4 MG/DL — SIGNIFICANT CHANGE UP (ref 2.5–4.5)
PHOSPHATE SERPL-MCNC: 3.5 MG/DL — SIGNIFICANT CHANGE UP (ref 2.5–4.5)
PLATELET # BLD AUTO: 184 K/UL — SIGNIFICANT CHANGE UP (ref 150–400)
PO2 BLDV: 33 MMHG — SIGNIFICANT CHANGE UP
PO2 BLDV: 67 MMHG — SIGNIFICANT CHANGE UP
POTASSIUM BLDV-SCNC: 4.2 MMOL/L — SIGNIFICANT CHANGE UP (ref 3.5–5.1)
POTASSIUM BLDV-SCNC: 4.3 MMOL/L — SIGNIFICANT CHANGE UP (ref 3.5–5.1)
POTASSIUM SERPL-MCNC: 4.1 MMOL/L — SIGNIFICANT CHANGE UP (ref 3.5–5.3)
POTASSIUM SERPL-MCNC: 4.5 MMOL/L — SIGNIFICANT CHANGE UP (ref 3.5–5.3)
POTASSIUM SERPL-MCNC: 4.8 MMOL/L — SIGNIFICANT CHANGE UP (ref 3.5–5.3)
POTASSIUM SERPL-SCNC: 4.1 MMOL/L — SIGNIFICANT CHANGE UP (ref 3.5–5.3)
POTASSIUM SERPL-SCNC: 4.5 MMOL/L — SIGNIFICANT CHANGE UP (ref 3.5–5.3)
POTASSIUM SERPL-SCNC: 4.8 MMOL/L — SIGNIFICANT CHANGE UP (ref 3.5–5.3)
RBC # BLD: 6.31 M/UL — HIGH (ref 4.2–5.8)
RBC # FLD: 17.4 % — HIGH (ref 10.3–14.5)
SAO2 % BLDV: 38.6 % — SIGNIFICANT CHANGE UP
SAO2 % BLDV: 92.4 % — SIGNIFICANT CHANGE UP
SODIUM SERPL-SCNC: 128 MMOL/L — LOW (ref 135–145)
SODIUM SERPL-SCNC: 129 MMOL/L — LOW (ref 135–145)
SODIUM SERPL-SCNC: 132 MMOL/L — LOW (ref 135–145)
SODIUM UR-SCNC: 46 MMOL/L — SIGNIFICANT CHANGE UP
UUN UR-MCNC: 323 MG/DL — SIGNIFICANT CHANGE UP
WBC # BLD: 4.08 K/UL — SIGNIFICANT CHANGE UP (ref 3.8–10.5)
WBC # FLD AUTO: 4.08 K/UL — SIGNIFICANT CHANGE UP (ref 3.8–10.5)

## 2022-12-29 PROCEDURE — 99233 SBSQ HOSP IP/OBS HIGH 50: CPT

## 2022-12-29 PROCEDURE — 74176 CT ABD & PELVIS W/O CONTRAST: CPT | Mod: 26

## 2022-12-29 PROCEDURE — 99233 SBSQ HOSP IP/OBS HIGH 50: CPT | Mod: GC

## 2022-12-29 RX ORDER — INSULIN LISPRO 100/ML
VIAL (ML) SUBCUTANEOUS
Refills: 0 | Status: DISCONTINUED | OUTPATIENT
Start: 2022-12-29 | End: 2022-12-31

## 2022-12-29 RX ORDER — INSULIN GLARGINE 100 [IU]/ML
18 INJECTION, SOLUTION SUBCUTANEOUS
Qty: 1 | Refills: 0
Start: 2022-12-29 | End: 2023-01-27

## 2022-12-29 RX ORDER — INSULIN GLARGINE 100 [IU]/ML
18 INJECTION, SOLUTION SUBCUTANEOUS
Qty: 1 | Refills: 0
Start: 2022-12-29

## 2022-12-29 RX ORDER — CIPROFLOXACIN LACTATE 400MG/40ML
1 VIAL (ML) INTRAVENOUS
Qty: 14 | Refills: 0
Start: 2022-12-29 | End: 2023-01-04

## 2022-12-29 RX ORDER — MAGNESIUM SULFATE 500 MG/ML
2 VIAL (ML) INJECTION ONCE
Refills: 0 | Status: COMPLETED | OUTPATIENT
Start: 2022-12-29 | End: 2022-12-29

## 2022-12-29 RX ORDER — INSULIN GLARGINE 100 [IU]/ML
18 INJECTION, SOLUTION SUBCUTANEOUS
Refills: 0 | Status: DISCONTINUED | OUTPATIENT
Start: 2022-12-29 | End: 2022-12-29

## 2022-12-29 RX ORDER — CEFEPIME 1 G/1
1 INJECTION, POWDER, FOR SOLUTION INTRAMUSCULAR; INTRAVENOUS
Qty: 1 | Refills: 0
Start: 2022-12-29

## 2022-12-29 RX ORDER — SODIUM CHLORIDE 9 MG/ML
1000 INJECTION, SOLUTION INTRAVENOUS ONCE
Refills: 0 | Status: COMPLETED | OUTPATIENT
Start: 2022-12-29 | End: 2022-12-29

## 2022-12-29 RX ORDER — METFORMIN HYDROCHLORIDE 850 MG/1
1 TABLET ORAL
Qty: 60 | Refills: 0
Start: 2022-12-29 | End: 2023-01-27

## 2022-12-29 RX ORDER — INSULIN GLARGINE 100 [IU]/ML
18 INJECTION, SOLUTION SUBCUTANEOUS
Refills: 0 | Status: DISCONTINUED | OUTPATIENT
Start: 2022-12-29 | End: 2022-12-31

## 2022-12-29 RX ADMIN — INSULIN GLARGINE 18 UNIT(S): 100 INJECTION, SOLUTION SUBCUTANEOUS at 12:50

## 2022-12-29 RX ADMIN — Medication 25 GRAM(S): at 12:46

## 2022-12-29 RX ADMIN — Medication 3 MILLIGRAM(S): at 22:30

## 2022-12-29 RX ADMIN — Medication 3: at 09:19

## 2022-12-29 RX ADMIN — Medication 4: at 18:04

## 2022-12-29 RX ADMIN — FEBUXOSTAT 80 MILLIGRAM(S): 40 TABLET ORAL at 12:36

## 2022-12-29 RX ADMIN — CEFTRIAXONE 100 MILLIGRAM(S): 500 INJECTION, POWDER, FOR SOLUTION INTRAMUSCULAR; INTRAVENOUS at 09:26

## 2022-12-29 RX ADMIN — SODIUM CHLORIDE 1000 MILLILITER(S): 9 INJECTION, SOLUTION INTRAVENOUS at 08:01

## 2022-12-29 RX ADMIN — Medication 6 UNIT(S): at 18:04

## 2022-12-29 RX ADMIN — HEPARIN SODIUM 5000 UNIT(S): 5000 INJECTION INTRAVENOUS; SUBCUTANEOUS at 22:30

## 2022-12-29 RX ADMIN — HEPARIN SODIUM 5000 UNIT(S): 5000 INJECTION INTRAVENOUS; SUBCUTANEOUS at 13:22

## 2022-12-29 RX ADMIN — Medication 6 UNIT(S): at 09:19

## 2022-12-29 RX ADMIN — HEPARIN SODIUM 5000 UNIT(S): 5000 INJECTION INTRAVENOUS; SUBCUTANEOUS at 05:32

## 2022-12-29 RX ADMIN — Medication 6 UNIT(S): at 12:36

## 2022-12-29 NOTE — PROGRESS NOTE ADULT - ASSESSMENT
62 year old man with history of DM2 on metformin ertugliflozin, gout on febuxostat, HTN on metoprolol and Valsartan-HCTZ presenting with burning with urination since Thursday (12/22). In the ED, patient had hypotension to 81/59 with HR of 89, temp of 100F, saturating well on RA. No leukocytosis on labs, hemoglobin of 10.2. Hyponatremic down to 122 with elevated glucose to 351, Cr elevated to 2.16. AG of 18 with elevated BHB, and lactate, non-acidotic on VBG. Small ketones, negative nitrite, moderate leuk esterase on UA. CXR clear. Patient received 3L IVF, was given dose of 1g ceftriaxone. Was not started on an insulin drip. Endocrine consulted for DM2 and DKA.    Poorly controlled T2DM with hyperglycemia and DKA  DM diagnosis: 15 years ago  Last A1c: 9.0  Endocrinologist: Yesenia Fiore in [facility] DM meds: Steglatro 15mg daily and metformin 1g BID   -Hold oral DM agents while inpatient  -AG remains mildly elevated 17 then 16 on repeat  BHB downtrend to 0.8  Glucose high in 300s  Lantus 18 units given mid day then q24h  Admelog 6/6/6  moderate scale premeal and moderate bedtime    -Goal -180 on the floors  -RD consult  Discharge plan:  -basal insulin pen plus metformin  -Recommend routine outpatient ophthalmology and endocrinology f/u. Can f/u with Dr. Fiore for Endocrine.    HTN  -Outpatient goal BP <130/80. BP well controlled on no HTN meds. Management per primary team.    HLD  -Would likely benefit from a statin if no contraindication  -Can check lipid profile if not done recently    Discussed with primary team.    Lito Damon DO, Endocrinology Fellow  For follow-up questions, discharge recommendations, or new consults please call answering service at [phone] (weekdays), [phone] (nights/weekends). For nonurgent matters, please email [email or web address] or [email or web address]. 62 year old man with history of DM2 on metformin ertugliflozin, gout on febuxostat, HTN on metoprolol and Valsartan-HCTZ presenting with burning with urination since Thursday (12/22). In the ED, patient had hypotension to 81/59 with HR of 89, temp of 100F, saturating well on RA. No leukocytosis on labs, hemoglobin of 10.2. Hyponatremic down to 122 with elevated glucose to 351, Cr elevated to 2.16. AG of 18 with elevated BHB, and lactate, non-acidotic on VBG. Small ketones, negative nitrite, moderate leuk esterase on UA. CXR clear. Patient received 3L IVF, was given dose of 1g ceftriaxone. Was not started on an insulin drip. Endocrine consulted for DM2 and DKA.    Poorly controlled T2DM with hyperglycemia and DKA  DM diagnosis: 15 years ago  Last A1c: 9.0  Endocrinologist: Dr. Yesenia Fiore in [facility] DM meds: Steglatro 15mg daily and metformin 1g BID   -Hold oral DM agents while inpatient  -AG remains mildly elevated 17 then 16 on repeat  BHB downtrend to 0.8  Glucose high in 300s  Increased basal insulin today - Lantus 18 units given mid day then q24h  Admelog 6/6/6  Increase to moderate scale premeal and moderate bedtime  -Goal -180 mg/dl  -RD consult  -Insulin pen teaching initiated  -Recheck labs with anion gap and BHB in AM  Lactate improved to normal range    Discharge plan: Reviewed with patient that given the persistent lab values (residual from DKA) and persistent hyperglycemia that would want to dc with basal insulin pen   -basal insulin pen dose TBD plus metformin 1000mg BID  -Would keep patient overnight until tomorrow to assess if basal dose of 18 units is appropriate - may need higher dose for dc.  Please check coverage for basal insulin pen.  Will need BD mario pen needles  ensure patient has glucometer and supplies  -Recommend routine outpatient ophthalmology and endocrinology f/u. Can f/u with Dr. Fiore for Endocrine.    HTN  -Outpatient goal BP <130/80. BP well controlled on no HTN meds. Management per primary team.    HLD  -Would likely benefit from a statin if no contraindication  -Can check lipid profile if not done recently    Discussed with primary team throughout the day.  Jessica Miles MD  Division of Endocrinology  Pager: [phone]    If after 6PM or before 9AM, or on weekends/holidays, please call endocrine answering service for assistance ([phone]).  For nonurgent matters email [email or web address] for assistance.

## 2022-12-29 NOTE — PROGRESS NOTE ADULT - PROBLEM SELECTOR PLAN 8
- On Metformin and Ertugliflozin at home. Holding iso of hospitalization and DKA  - Treating for DKA currently, 12U Lantus and then moderate correctional admelog q4hr  - Endo following  - Last A1C in 8/2022 per outpatient record: 9.2 - 10.6 on admission  - MCV of ~60  - Possible thalassemia, will F/U with patient

## 2022-12-29 NOTE — PROGRESS NOTE ADULT - PROBLEM SELECTOR PLAN 2
- Patient with history of DMII on home metformin, ertugliflozin  - Found to have elevated anion gap, glucose, BHB, small ketones on urinalysis, decreased bicarb, with borderline pH on VBG  - Patient endorses being compliant with home meds, no endorsement of chest pain and EKG appears non-ischemic, could possibly be in setting of his likely UTI  - Endo consulted, MICU consulted  - S/P 3L IVF in ED  - AG had opened up this AM and patient was put back on NPO, 14U lantus and q4hr moderate ISS  - BMP, VBG, BHB q4  - Replete electrolytes as needed  - s/p AG closure for resumption of diet then onto 6U pre-meal with low ISS pre meal and bedtime and 14U lantus  - Endo will give recommendations upon DC, likely GLP-1 agonist, Metformin, +/- insulin - Borderline SIRS criteria with his heart rate, had fevers as outpatient, 100F here  - Likely urinary source iso patient's history and data  - Monitor for further fevers  - Hypotensive on admission, resolved with IVF, on maintenance fluids  - Blood cx negative  - urine cx negative  - outpatient urine cx growing E Coli, sensitivities pending   - C/W ceftriaxone for now given likely urinary source  - F/U CT A/P for possible prostatitis - Borderline SIRS criteria with his heart rate, had fevers as outpatient, 100F here  - Likely urinary source iso patient's history and data  - Monitor for further fevers  - Hypotensive on admission, resolved with IVF, on maintenance fluids  - Blood cx negative  - urine cx negative  - outpatient urine cx growing E Coli, sensitivities resulted   - C/W ceftriaxone for now given likely urinary source  - DC on Ciprofloxacin 7d  -s/p CT ap no signs of prostatitis

## 2022-12-29 NOTE — PROGRESS NOTE ADULT - SUBJECTIVE AND OBJECTIVE BOX
Kizzy Birmingham MD  PGY 2 Department of Internal Medicine  Pager: [phone] ([facility]) /[phone] ([facility])       Patient is a 62y old  Male who presents with a chief complaint of Sepsis, UTI versus Prostatitis (28 Dec 2022 20:44)      SUBJECTIVE / OVERNIGHT EVENTS: Pt seen and examined. No acute overnight events. Denies fevers, chills, CP, SOB, Abdominal pain, N/V, Constipation, Diarrhea        MEDICATIONS  (STANDING):  cefTRIAXone   IVPB 1000 milliGRAM(s) IV Intermittent every 24 hours  dextrose 5%. 1000 milliLiter(s) (100 mL/Hr) IV Continuous <Continuous>  dextrose 5%. 1000 milliLiter(s) (50 mL/Hr) IV Continuous <Continuous>  dextrose 50% Injectable 25 Gram(s) IV Push once  dextrose 50% Injectable 12.5 Gram(s) IV Push once  dextrose 50% Injectable 25 Gram(s) IV Push once  febuxostat 80 milliGRAM(s) Oral daily  glucagon  Injectable 1 milliGRAM(s) IntraMuscular once  heparin   Injectable 5000 Unit(s) SubCutaneous every 8 hours  insulin glargine Injectable (LANTUS) 14 Unit(s) SubCutaneous <User Schedule>  insulin lispro (ADMELOG) corrective regimen sliding scale   SubCutaneous three times a day before meals  insulin lispro (ADMELOG) corrective regimen sliding scale   SubCutaneous at bedtime  insulin lispro Injectable (ADMELOG) 6 Unit(s) SubCutaneous three times a day before meals  lactated ringers Bolus 1000 milliLiter(s) IV Bolus once  melatonin 3 milliGRAM(s) Oral at bedtime    MEDICATIONS  (PRN):  dextrose Oral Gel 15 Gram(s) Oral once PRN Blood Glucose LESS THAN 70 milliGRAM(s)/deciliter      I&O's Summary    28 Dec 2022 07:01  -  29 Dec 2022 07:00  --------------------------------------------------------  IN: 200 mL / OUT: 770 mL / NET: -570 mL        Vital Signs Last 24 Hrs  T(C): 36.7 (29 Dec 2022 05:45), Max: 37.7 (28 Dec 2022 22:05)  T(F): 98.1 (29 Dec 2022 05:45), Max: 99.8 (28 Dec 2022 22:05)  HR: 91 (29 Dec 2022 05:45) (78 - 91)  BP: 131/88 (29 Dec 2022 05:45) (131/85 - 138/89)  BP(mean): --  RR: 17 (29 Dec 2022 05:45) (17 - 18)  SpO2: 99% (29 Dec 2022 05:45) (94% - 100%)    Parameters below as of 29 Dec 2022 05:45  Patient On (Oxygen Delivery Method): room air        CAPILLARY BLOOD GLUCOSE      POCT Blood Glucose.: 278 mg/dL (28 Dec 2022 21:54)  POCT Blood Glucose.: 215 mg/dL (28 Dec 2022 17:42)  POCT Blood Glucose.: 176 mg/dL (28 Dec 2022 14:21)  POCT Blood Glucose.: 207 mg/dL (28 Dec 2022 13:04)  POCT Blood Glucose.: 203 mg/dL (28 Dec 2022 08:46)      PHYSICAL EXAM:  GENERAL: NAD,   HEAD:  Atraumatic, Normocephalic  EYES: EOMI, PERRL, conjunctiva and sclera clear  NECK: No JVD  CHEST/LUNG: Clear to auscultation bilaterally; No wheeze  HEART: Regular rate and rhythm; No murmurs, rubs, or gallops  ABDOMEN: Soft, Nontender, Nondistended; Bowel sounds present  EXTREMITIES:  2+ Peripheral Pulses, No clubbing, cyanosis, or edema  PSYCH: AAOx3  NEUROLOGY: non-focal  SKIN: No rashes or lesions       LABS:                        11.9   4.08  )-----------( 184      ( 29 Dec 2022 05:20 )             37.9     Auto Eosinophil # x     / Auto Eosinophil % x     / Auto Neutrophil # x     / Auto Neutrophil % x     / BANDS % x                            11.4   3.83  )-----------( 155      ( 28 Dec 2022 07:18 )             35.6     Auto Eosinophil # 0.02  / Auto Eosinophil % 0.5   / Auto Neutrophil # 3.07  / Auto Neutrophil % 81.7  / BANDS % x        12-29    129<L>  |  93<L>  |  23  ----------------------------<  257<H>  4.1   |  19<L>  |  1.38<H>  12    128<L>  |  96<L>  |  25<H>  ----------------------------<  301<H>  4.8   |  21<L>  |  1.37<H>  12    130<L>  |  96<L>  |  23  ----------------------------<  299<H>  4.7   |  21<L>  |  1.37<H>    Ca    9.4      29 Dec 2022 05:20  Mg     1.70       Phos  3.5               Urinalysis Basic - ( 27 Dec 2022 08:10 )    Color: Light Yellow / Appearance: Clear / S.010 / pH: x  Gluc: x / Ketone: Trace  / Bili: Negative / Urobili: <2 mg/dL   Blood: x / Protein: Trace / Nitrite: Negative   Leuk Esterase: Moderate / RBC: 3 /HPF / WBC 13 /HPF   Sq Epi: x / Non Sq Epi: 2 /HPF / Bacteria: Occasional            RADIOLOGY & ADDITIONAL TESTS:    Imaging Personally Reviewed:    Consultant(s) Notes Reviewed:      Care Discussed with Consultants/Other Providers:   Kizzy Birmingham MD  PGY 2 Department of Internal Medicine  Pager: [phone] ([facility]) /[phone] ([facility])       Patient is a 62y old  Male who presents with a chief complaint of Sepsis, UTI versus Prostatitis (28 Dec 2022 20:44)      SUBJECTIVE / OVERNIGHT EVENTS: Pt seen and examined. Patient's gap remained closed overnight         MEDICATIONS  (STANDING):  cefTRIAXone   IVPB 1000 milliGRAM(s) IV Intermittent every 24 hours  dextrose 5%. 1000 milliLiter(s) (100 mL/Hr) IV Continuous <Continuous>  dextrose 5%. 1000 milliLiter(s) (50 mL/Hr) IV Continuous <Continuous>  dextrose 50% Injectable 25 Gram(s) IV Push once  dextrose 50% Injectable 12.5 Gram(s) IV Push once  dextrose 50% Injectable 25 Gram(s) IV Push once  febuxostat 80 milliGRAM(s) Oral daily  glucagon  Injectable 1 milliGRAM(s) IntraMuscular once  heparin   Injectable 5000 Unit(s) SubCutaneous every 8 hours  insulin glargine Injectable (LANTUS) 14 Unit(s) SubCutaneous <User Schedule>  insulin lispro (ADMELOG) corrective regimen sliding scale   SubCutaneous three times a day before meals  insulin lispro (ADMELOG) corrective regimen sliding scale   SubCutaneous at bedtime  insulin lispro Injectable (ADMELOG) 6 Unit(s) SubCutaneous three times a day before meals  lactated ringers Bolus 1000 milliLiter(s) IV Bolus once  melatonin 3 milliGRAM(s) Oral at bedtime    MEDICATIONS  (PRN):  dextrose Oral Gel 15 Gram(s) Oral once PRN Blood Glucose LESS THAN 70 milliGRAM(s)/deciliter      I&O's Summary    28 Dec 2022 07:01  -  29 Dec 2022 07:00  --------------------------------------------------------  IN: 200 mL / OUT: 770 mL / NET: -570 mL        Vital Signs Last 24 Hrs  T(C): 36.7 (29 Dec 2022 05:45), Max: 37.7 (28 Dec 2022 22:05)  T(F): 98.1 (29 Dec 2022 05:45), Max: 99.8 (28 Dec 2022 22:05)  HR: 91 (29 Dec 2022 05:45) (78 - 91)  BP: 131/88 (29 Dec 2022 05:45) (131/85 - 138/89)  BP(mean): --  RR: 17 (29 Dec 2022 05:45) (17 - 18)  SpO2: 99% (29 Dec 2022 05:45) (94% - 100%)    Parameters below as of 29 Dec 2022 05:45  Patient On (Oxygen Delivery Method): room air        CAPILLARY BLOOD GLUCOSE      POCT Blood Glucose.: 278 mg/dL (28 Dec 2022 21:54)  POCT Blood Glucose.: 215 mg/dL (28 Dec 2022 17:42)  POCT Blood Glucose.: 176 mg/dL (28 Dec 2022 14:21)  POCT Blood Glucose.: 207 mg/dL (28 Dec 2022 13:04)  POCT Blood Glucose.: 203 mg/dL (28 Dec 2022 08:46)      PHYSICAL EXAM:  CONSTITUTIONAL: NAD  HEENT: Moist oral mucosa, no pharyngeal injection or exudates  RESPIRATORY: Normal respiratory effort; lungs are clear to auscultation bilaterally  CARDIOVASCULAR: Regular rate and rhythm, normal S1 and S2, no murmur/rub/gallop, no lower extremity edema, peripheral pulses are 2+ bilaterally  ABDOMEN: Soft, nondistended, nontender to palpation, normoactive bowel sounds, no rebound/guarding  PSYCH: A+O to person, place, and time  NEUROLOGY: Facial expression appears symmetric, no gross sensory deficits appreciated, moving all extremities spontaneously  SKIN: No rashes; no palpable lesions         LABS:                        11.9   4.08  )-----------( 184      ( 29 Dec 2022 05:20 )             37.9     Auto Eosinophil # x     / Auto Eosinophil % x     / Auto Neutrophil # x     / Auto Neutrophil % x     / BANDS % x                            11.4   3.83  )-----------( 155      ( 28 Dec 2022 07:18 )             35.6     Auto Eosinophil # 0.02  / Auto Eosinophil % 0.5   / Auto Neutrophil # 3.07  / Auto Neutrophil % 81.7  / BANDS % x        12-29    129<L>  |  93<L>  |  23  ----------------------------<  257<H>  4.1   |  19<L>  |  1.38<H>  12    128<L>  |  96<L>  |  25<H>  ----------------------------<  301<H>  4.8   |  21<L>  |  1.37<H>  12    130<L>  |  96<L>  |  23  ----------------------------<  299<H>  4.7   |  21<L>  |  1.37<H>    Ca    9.4      29 Dec 2022 05:20  Mg     1.70       Phos  3.5               Urinalysis Basic - ( 27 Dec 2022 08:10 )    Color: Light Yellow / Appearance: Clear / S.010 / pH: x  Gluc: x / Ketone: Trace  / Bili: Negative / Urobili: <2 mg/dL   Blood: x / Protein: Trace / Nitrite: Negative   Leuk Esterase: Moderate / RBC: 3 /HPF / WBC 13 /HPF   Sq Epi: x / Non Sq Epi: 2 /HPF / Bacteria: Occasional

## 2022-12-29 NOTE — PROGRESS NOTE ADULT - ASSESSMENT
62 year old man with history of DMII on metformin ertugliflozin, gout on febuxostat, HTN on metoprolol and Valsartan-HCTZ presenting with burning with urination since Thursday (12/22). Had cystoscopy 12/22 with start of symptoms following with moderate Leuk esterase on urinalysis but negative nitrite, 13 WBC/hpf, concerning for UTI. Admitted for sepsis due to likely urinary source with course complicated by DKA with elevated anion gap, glucose, BHB, small ketones on urinalysis, decreased bicarb, borderline pH on VBG on urinalysis.

## 2022-12-29 NOTE — DISCHARGE NOTE PROVIDER - HOSPITAL COURSE
62 year old man with history of DMII on metformin ertugliflozin, gout on febuxostat, HTN on metoprolol and Valsartan-HCTZ presenting with burning with urination since Thursday (12/22). Had cystoscopy 12/22 with start of symptoms following with moderate Leuk esterase on urinalysis but negative nitrite, 13 WBC/hpf, concerning for UTI. Admitted for sepsis due to likely urinary source with course complicated by DKA with elevated anion gap, glucose, BHB, small ketones on urinalysis, decreased bicarb, borderline pH on VBG on urinalysis.    In the ED, patient had hypotension to 81/59 with HR of 89, temp of 100F, saturating well on RA. No leukocytosis on labs, hemoglobin of 10.2. Hyponatremic down to 122 with elevated glucose to 351, Cr elevated to 2.16. AG of 18 with elevated BHB, and lactate, non-acidotic on VBG. Small ketones, negative nitrite, moderate leuk esterase on UA. CXR clear. Patient received 3L IVF, was given dose of 1g ceftriaxone. Patient was continued on ceftriaxone for presumed UTI. Blood cultures remained no growth during admission and urine cultures here demonstrated no growth. Outpatient urine culture demonstrated growth of >100k E. coli. Sensitivities on outpatient culture showed _______ and he was switched to _____.    Margarte was consulted for his DKA and recommended 12U lantus with moderate correctional scale q4hr. He showed improvement initially with closure of his gap and improvement in his pH and lowering of his BHB. Overnight into 12/28 his gap reopened and he was made NPO again and resumed on lantus at 14U and moderate correctional scale. Gap then closed again, remained non-acidotic and his BHB remained lower than previously and he was continued on CC diet with lantus 18U and 6U admelog before meals and low correctional scale before meals and at bedtime. Prior to discharge, margaret recommended patient go home on regimen of ___________.    Patient should follow up with his PCP provider in a week and his endocrinologist in a week upon discharge. 62 year old man with history of DMII on metformin ertugliflozin, gout on febuxostat, HTN on metoprolol and Valsartan-HCTZ presenting with burning with urination since Thursday (12/22). Had cystoscopy 12/22 with start of symptoms following with moderate Leuk esterase on urinalysis but negative nitrite, 13 WBC/hpf, concerning for UTI. Admitted for sepsis due to likely urinary source with course complicated by DKA with elevated anion gap, glucose, BHB, small ketones on urinalysis, decreased bicarb, borderline pH on VBG on urinalysis.    In the ED, patient had hypotension to 81/59 with HR of 89, temp of 100F, saturating well on RA. No leukocytosis on labs, hemoglobin of 10.2. Hyponatremic down to 122 with elevated glucose to 351, Cr elevated to 2.16. AG of 18 with elevated BHB, and lactate, non-acidotic on VBG. Small ketones, negative nitrite, moderate leuk esterase on UA. CXR clear. Patient received 3L IVF, was given dose of 1g ceftriaxone. Patient was continued on ceftriaxone for presumed UTI. Blood cultures remained no growth during admission and urine cultures here demonstrated no growth. Outpatient urine culture demonstrated growth of >100k E. coli. CT ap  showed mild bladder wall thickening and mild hydro.     Patient also with mild hyponatremia and NAHEED which improved with IVF. Patient also hypotensive on presentation, improved with IVF bolus, blood pressure medications held     Endo was consulted for his DKA, possible euglycemic DKA in setting of SGLT2-inhibitor. Given Lantus  with moderate correctional scale q4hr. He showed improvement initially with closure of his gap and improvement in his pH and lowering of his BHB. Overnight into 12/28 his gap reopened and he was made NPO again and resumed on lantus at 14U and moderate correctional scale. Gap then closed again, remained non-acidotic and his BHB remained lower than previously and he was continued on CC diet with lantus 18U and 6U admelog before meals and low correctional scale before meals and at bedtime. Prior to discharge, endo recommended patient go home on regimen of Lantus 18u with Metformin. Patient not recommended for SGLT2-inhibitors outpatient.     Patient should follow up with his PCP provider in a week and his endocrinologist in a week upon discharge.    Patient will be discharged with Lantus 18u and Ciprofloxacin 500mg bid for 7 additional days. Patient was given diabetic teaching. Medically optimized for discharge. 62 year old man with history of DMII on metformin ertugliflozin, gout on febuxostat, HTN on metoprolol and Valsartan-HCTZ presenting with burning with urination since Thursday (12/22). Had cystoscopy 12/22 with start of symptoms following with moderate Leuk esterase on urinalysis but negative nitrite, 13 WBC/hpf, concerning for UTI. Admitted for sepsis due to likely urinary source with course complicated by DKA with elevated anion gap, glucose, BHB, small ketones on urinalysis, decreased bicarb, borderline pH on VBG on urinalysis.    In the ED, patient had hypotension to 81/59 with HR of 89, temp of 100F, saturating well on RA. No leukocytosis on labs, hemoglobin of 10.2. Hyponatremic down to 122 with elevated glucose to 351, Cr elevated to 2.16. AG of 18 with elevated BHB, and lactate, non-acidotic on VBG. Small ketones, negative nitrite, moderate leuk esterase on UA. CXR clear. Patient received 3L IVF, was given dose of 1g ceftriaxone. Patient was continued on ceftriaxone for presumed UTI. Blood cultures remained no growth during admission and urine cultures here demonstrated no growth. Outpatient urine culture demonstrated growth of >100k E. coli. CT ap  showed mild bladder wall thickening and mild hydro.     Patient also with mild hyponatremia and NAHEED which improved with IVF. Patient also hypotensive on presentation, improved with IVF bolus, and held stable for rest of admission.    Endo was consulted for his DKA, possible euglycemic DKA in setting of SGLT2-inhibitor. Given Lantus  with moderate correctional scale q4hr. He showed improvement initially with closure of his gap and improvement in his pH and lowering of his BHB. Overnight into 12/28 his gap reopened and he was made NPO again and resumed on lantus at 14U and moderate correctional scale. Gap then closed again, remained non-acidotic and his BHB remained lower than previously and he was continued on CC diet with lantus 18U and 6U admelog before meals and low correctional scale before meals and at bedtime. Prior to discharge, endo recommended patient go home on regimen of Lantus 22U, and if his fasting sugar consistently remains above 130 he will increase to 25U of lantus daily, with Metformin. Patient not recommended for SGLT2-inhibitors outpatient.     Patient should follow up with his PCP provider in a week and his endocrinologist in a week upon discharge. Patient should have BMP repeated when he follows up with PCP.    Patient will be discharged with Lantus 22U, with plan for daily fasting fingerstick checks. If his fasting sugar consistently remains above 130 he will increase to 25U of lantus daily and Ciprofloxacin 500mg bid for 7 additional days. Patient was given diabetic teaching. Medically optimized for discharge. 62 year old man with history of DMII on metformin ertugliflozin, gout on febuxostat, HTN on metoprolol and Valsartan-HCTZ presenting with burning with urination since Thursday (12/22). Had cystoscopy 12/22 with start of symptoms following with moderate Leuk esterase on urinalysis but negative nitrite, 13 WBC/hpf, concerning for UTI. Admitted for sepsis, present on admission, due to likely urinary source with course complicated by DKA with elevated anion gap, glucose, BHB, small ketones on urinalysis, decreased bicarb, borderline pH on VBG on urinalysis.    In the ED, patient had hypotension to 81/59 with HR of 89, temp of 100F, saturating well on RA. No leukocytosis on labs, hemoglobin of 10.2. Hyponatremic down to 122 with elevated glucose to 351, Cr elevated to 2.16. AG of 18 with elevated BHB, and lactate, non-acidotic on VBG. Small ketones, negative nitrite, moderate leuk esterase on UA. CXR clear. Patient received 3L IVF, was given dose of 1g ceftriaxone. Patient was continued on ceftriaxone for presumed UTI. Blood cultures remained no growth during admission and urine cultures here demonstrated no growth. Outpatient urine culture demonstrated growth of >100k E. coli. CT ap  showed mild bladder wall thickening and mild hydro.     Patient also with mild hyponatremia and NAHEED which improved with IVF. Patient also hypotensive on presentation, improved with IVF bolus, and held stable for rest of admission.    Endo was consulted for his DKA, possible euglycemic DKA in setting of SGLT2-inhibitor. Given Lantus  with moderate correctional scale q4hr. He showed improvement initially with closure of his gap and improvement in his pH and lowering of his BHB. Overnight into 12/28 his gap reopened and he was made NPO again and resumed on lantus at 14U and moderate correctional scale. Gap then closed again, remained non-acidotic and his BHB remained lower than previously and he was continued on CC diet with lantus 18U and 6U admelog before meals and low correctional scale before meals and at bedtime. Prior to discharge, endo recommended patient go home on regimen of Lantus 22U, and if his fasting sugar consistently remains above 130 he will increase to 25U of lantus daily, with Metformin. Patient not recommended for SGLT2-inhibitors outpatient.     Patient should follow up with his PCP provider in a week and his endocrinologist in a week upon discharge. Patient should have BMP repeated when he follows up with PCP.    Patient will be discharged with Lantus 22U, with plan for daily fasting fingerstick checks. If his fasting sugar consistently remains above 130 he will increase to 25U of lantus daily and Ciprofloxacin 500mg bid for 7 additional days. Patient was given diabetic teaching. Medically optimized for discharge.

## 2022-12-29 NOTE — PROGRESS NOTE ADULT - PROBLEM SELECTOR PLAN 10
- C/W home febuxostat 80mg daily - On valsartan-HCTZ and metoprolol at home  - Holding iso episodes of hypotension, and NAHEED  - Continue to monitor BPs

## 2022-12-29 NOTE — PROGRESS NOTE ADULT - PROBLEM SELECTOR PLAN 6
- Likely iso sepsis   - Will continue to monitor - 122 on admission, 127 on repeat  - Poor PO intake, but has normal serum osm, hyperglycemia, likely is pseudohyponatremia due to hypertonicity given hyperglycemia  - Sodium correcting above 130 based on glucose levels  - Will continue to monitor - 122 on admission, 127 on repeat  - Poor PO intake, but has normal serum osm, hyperglycemia, likely is pseudohyponatremia due to hypertonicity given hyperglycemia  - Sodium correcting above 130 based on glucose levels  - urine studies sent today: urine Na, osm,   - Will continue to monitor

## 2022-12-29 NOTE — PROGRESS NOTE ADULT - PROBLEM SELECTOR PLAN 7
- 10.6 on admission  - MCV of ~60  - Possible thalassemia, will F/U with patient - Likely iso sepsis   - Will continue to monitor

## 2022-12-29 NOTE — PROGRESS NOTE ADULT - PROBLEM SELECTOR PLAN 1
- Borderline SIRS criteria with his heart rate, had fevers as outpatient, 100F here  - Likely urinary source iso patient's history and data  - Monitor for further fevers  - Hypotensive on admission, resolved with IVF, on maintenance fluids  - Blood cx negative  - urine cx negative  - outpatient urine cx growing E Coli, sensitivities pending   - C/W ceftriaxone for now given likely urinary source  - F/U CT A/P for possible prostatitis Elevated lactate 12/29  s/p 1L LR     - will repeat labs in afternoon Elevated lactate 12/29  s/p 1L LR   Lactate cleared

## 2022-12-29 NOTE — PROGRESS NOTE ADULT - SUBJECTIVE AND OBJECTIVE BOX
Chief Complaint: DKA    History: Went for CT today  learning how to inject insulin pen with RN  AG remained somewhat elevated today  Given higher Lantus dose of 18 units mid day    MEDICATIONS  (STANDING):  cefTRIAXone   IVPB 1000 milliGRAM(s) IV Intermittent every 24 hours  dextrose 5%. 1000 milliLiter(s) (100 mL/Hr) IV Continuous <Continuous>  dextrose 5%. 1000 milliLiter(s) (50 mL/Hr) IV Continuous <Continuous>  dextrose 50% Injectable 25 Gram(s) IV Push once  dextrose 50% Injectable 12.5 Gram(s) IV Push once  dextrose 50% Injectable 25 Gram(s) IV Push once  febuxostat 80 milliGRAM(s) Oral daily  glucagon  Injectable 1 milliGRAM(s) IntraMuscular once  heparin   Injectable 5000 Unit(s) SubCutaneous every 8 hours  insulin glargine Injectable (LANTUS) 18 Unit(s) SubCutaneous <User Schedule>  insulin lispro (ADMELOG) corrective regimen sliding scale   SubCutaneous three times a day before meals  insulin lispro (ADMELOG) corrective regimen sliding scale   SubCutaneous at bedtime  insulin lispro Injectable (ADMELOG) 6 Unit(s) SubCutaneous three times a day before meals  melatonin 3 milliGRAM(s) Oral at bedtime    MEDICATIONS  (PRN):  dextrose Oral Gel 15 Gram(s) Oral once PRN Blood Glucose LESS THAN 70 milliGRAM(s)/deciliter      Allergies    No Known Allergies    Intolerances      Review of Systems:  Constitutional: No fever  Eyes: No blurry vision  Neuro: No tremors  HEENT: No pain  Cardiovascular: No chest pain, palpitations  Respiratory: No SOB, no cough  GI: No nausea, vomiting, abdominal pain  : No dysuria  Skin: no rash  Psych: no depression  Endocrine: no polyuria, polydipsia  Hem/lymph: no swelling  Osteoporosis: no fractures    ALL OTHER SYSTEMS REVIEWED AND NEGATIVE      PHYSICAL EXAM:  VITALS: T(C): 37.1 (12-29-22 @ 14:22)  T(F): 98.8 (12-29-22 @ 14:22), Max: 99.8 (12-28-22 @ 22:05)  HR: 84 (12-29-22 @ 14:22) (84 - 91)  BP: 148/91 (12-29-22 @ 14:22) (131/85 - 148/91)  RR:  (17 - 18)  SpO2:  (99% - 100%)  Wt(kg): --  GENERAL: NAD, well-groomed, well-developed  EYES: No proptosis, no lid lag, anicteric  HEENT:  Atraumatic, Normocephalic, moist mucous membranes  RESPIRATORY: nonlabored respirations, no wheezing  PSYCH: Alert and oriented x 3, normal affect, normal mood    CAPILLARY BLOOD GLUCOSE      POCT Blood Glucose.: 338 mg/dL (29 Dec 2022 12:33)  POCT Blood Glucose.: 273 mg/dL (29 Dec 2022 09:01)  POCT Blood Glucose.: 278 mg/dL (28 Dec 2022 21:54)  POCT Blood Glucose.: 215 mg/dL (28 Dec 2022 17:42)      12-29    132<L>  |  96<L>  |  22  ----------------------------<  364<H>  4.5   |  20<L>  |  1.21    eGFR: 69    Ca    9.2      12-29  Mg     1.50     12-29  Phos  3.4     12-29    TPro  6.0  /  Alb  2.9<L>  /  TBili  1.1  /  DBili  x   /  AST  19  /  ALT  19  /  AlkPhos  90  12-27      A1C with Estimated Average Glucose Result: 9.0 % (12-27-22 @ 06:25)      Thyroid Function Tests:

## 2022-12-29 NOTE — PROGRESS NOTE ADULT - ATTENDING COMMENTS
62M h/o DM-II on metformin/ertugliflozin, gout on febuxostat, HTN on metoprolol and Valsartan-HCTZ presenting with burning with urination since Thursday 12/22 s/p cystoscopy 12/21 a/w sepsis 2/2 UTI  course c/b DKA.    1. Sepsis 2/2 UTI   -sepsis resolving  -urine cx NGTD  -CT a/p low suspicion for prostatitis   -c/w ceftriaxone IV for now. transition to cipro on dc to complete total 7 day course  -f/u PCP in 1 week       2. Hypotension likely 2/2 sepsis:  -resolved  -resume home metoprolol on dc. hold valsartan-hctz. f/u pcp for medication adjustment in 1 week     3. DKA:  -gap opened again this morning - improved with IVF. will repeat BMP later today  -SoE0h=1.0%; holding home DM meds  -started basal/bolus insulin this admission. f/u endo for discharge recs

## 2022-12-29 NOTE — DISCHARGE NOTE PROVIDER - PROVIDER TOKENS
PROVIDER:[TOKEN:[[id number]],FOLLOWUP:[1 week],ESTABLISHEDPATIENT:[T]] PROVIDER:[TOKEN:[[id number]],FOLLOWUP:[1 week],ESTABLISHEDPATIENT:[T]],PROVIDER:[TOKEN:[[id number]],FOLLOWUP:[1 week],ESTABLISHEDPATIENT:[T]]

## 2022-12-29 NOTE — DISCHARGE NOTE PROVIDER - CARE PROVIDER_API CALL
Darryn Pedroza  INTERNAL MEDICINE  [id number] [place] [address]  Phone: [phone]  Fax: [phone]  Established Patient  Follow Up Time: 1 week   Darryn Pedroza  INTERNAL MEDICINE  [address] [place] [address]  Phone: [phone]  Fax: [phone]  Established Patient  Follow Up Time: 1 week    CURTIS CHINCHILLA  Internal Medicine  [address] [place] [address]  Phone: [phone]  Fax: [phone]  Established Patient  Follow Up Time: 1 week

## 2022-12-29 NOTE — PROGRESS NOTE ADULT - TIME BILLING
- Review of records, telemetry, vital signs and daily labs.   - General and cardiovascular physical examination.  - Generation of cardiovascular treatment plan.  - Coordination of care.      Patient was seen and examined by me on 12/28/22,interim events noted,labs and radiology studies reviewed.  Edison Huston MD,FACC.  [address] [place], [place] [id number].  [phone]
coordinating care re inpatient and dc plan

## 2022-12-29 NOTE — PROGRESS NOTE ADULT - PROBLEM SELECTOR PLAN 9
- On valsartan-HCTZ and metoprolol at home  - Holding iso episodes of hypotension, and NAHEED  - Continue to monitor BPs - On Metformin and Ertugliflozin at home. Holding iso of hospitalization and DKA  - Treating for DKA currently, 12U Lantus and then moderate correctional admelog q4hr  - Endo following  - Last A1C in 8/2022 per outpatient record: 9.2 - On Metformin and Ertugliflozin at home. Holding iso of hospitalization and DKA  - Treating for DKA currently, 18U Lantus and then moderate correctional admelog q4hr  - Endo following  - Last A1C in 2022 per outpatient record: 9.2  - DC planninu Lantus and Metformin  - Will not dc on SGLT2-I   - s/p diabetic teaching

## 2022-12-29 NOTE — PROGRESS NOTE ADULT - PROBLEM SELECTOR PLAN 4
- Cr 1.21 as per outpatient labs from 8/2022  - Possibly pre-renal etiology versus ATN from hypotension/sepsis  - Trend Cr, dose meds by eGFR, avoid nephrotoxic agents  - Improving back towards baseline - Likely due to outpatient cystoscopy done on 12/22, especially given patient endorsement that symptoms started following that procedure  - Had been seen at his PCP on Monday 12/26/22 and had urine studies sent, was started on Cipro 500mg BID  - UA here with moderate leuk esterase, negative nitrite, 13 WBC/hpf  - S/P dose of ceftriaxone in the ED, will continue on ceftriaxone  - Urine culture here no growth, likely due to cipro patient was taking  - Outpatient culture growing >100k E.coli, will follow up sensitivities that they get  - F/U CT A/P for possibility of prostatitis. Unable to do with IV due to NAHEED - Likely due to outpatient cystoscopy done on 12/22, especially given patient endorsement that symptoms started following that procedure  - Had been seen at his PCP on Monday 12/26/22 and had urine studies sent, was started on Cipro 500mg BID  - UA here with moderate leuk esterase, negative nitrite, 13 WBC/hpf  - S/P dose of ceftriaxone in the ED, will continue on ceftriaxone  - Urine culture here no growth, likely due to cipro patient was taking  - Outpatient culture growing >100k E.coli, pansensitive   - CT ap no signs of prostatitis   - Plan to DC on 7 days of Ciprofloxacin to complete 10d course

## 2022-12-29 NOTE — PROGRESS NOTE ADULT - PROBLEM SELECTOR PLAN 3
- Likely due to outpatient cystoscopy done on 12/22, especially given patient endorsement that symptoms started following that procedure  - Had been seen at his PCP on Monday 12/26/22 and had urine studies sent, was started on Cipro 500mg BID  - UA here with moderate leuk esterase, negative nitrite, 13 WBC/hpf  - S/P dose of ceftriaxone in the ED, will continue on ceftriaxone  - Urine culture here no growth, likely due to cipro patient was taking  - Outpatient culture growing >100k E.coli, will follow up sensitivities that they get  - F/U CT A/P for possibility of prostatitis. Unable to do with IV due to NAHEED - Patient with history of DMII on home metformin, ertugliflozin  - Found to have elevated anion gap, glucose, BHB, small ketones on urinalysis, decreased bicarb, with borderline pH on VBG  - Patient endorses being compliant with home meds, no endorsement of chest pain and EKG appears non-ischemic, could possibly be in setting of his likely UTI  - Endo consulted, MICU consulted  - S/P 3L IVF in ED  - AG had opened up this AM and patient was put back on NPO, 14U lantus and q4hr moderate ISS  - BMP, VBG, BHB q4  - Replete electrolytes as needed  - s/p AG closure for resumption of diet then onto 6U pre-meal with low ISS pre meal and bedtime and 14U lantus  - Endo will give recommendations upon DC, likely GLP-1 agonist, Metformin, +/- insulin - Patient with history of DMII on home metformin, ertugliflozin  - Found to have elevated anion gap, glucose, BHB, small ketones on urinalysis, decreased bicarb, with borderline pH on VBG  - Patient endorses being compliant with home meds, no endorsement of chest pain and EKG appears non-ischemic, could possibly be in setting of his likely UTI  - Endo consulted, MICU consulted  - S/P 3L IVF in ED  - AG had opened up this AM and patient was put back on NPO, 14U lantus and q4hr moderate ISS  - BMP, VBG, BHB q4  - Replete electrolytes as needed  - s/p AG closure for resumption of diet then onto 6U pre-meal with low ISS pre meal and bedtime and 14U lantus  - Endo will give recommendations upon DC, Lantus 18u with Metformin   - Diabetes teaching complete

## 2022-12-29 NOTE — DISCHARGE NOTE PROVIDER - NSDCMRMEDTOKEN_GEN_ALL_CORE_FT
CIPROFLOXACN 500MG TAB:   FEBUXOSTAT 80MG TAB:   Insulin Pen Needles, 4mm: 1 application subcutaneously 4 times a day. ** Use with insulin pen **   Lantus Solostar Pen 100 units/mL subcutaneous solution: 18 unit(s) subcutaneous once a day   METOPROL SUC 100MG ER TAB:   STEGLATRO 15 MG TABLET:   VALSARTN -12.5 TAB:    CIPROFLOXACN 500MG TAB: 1 tab(s) orally every 12 hours for 7 more days   FEBUXOSTAT 80MG TAB: 1 tab(s) orally once a day  Insulin Pen Needles, 4mm: 1 application subcutaneously 4 times a day. ** Use with insulin pen **   METOPROL SUC 100MG ER TAB:   Semglee (Prefilled Pen) 100 units/mL subcutaneous solution: 18 unit(s) subcutaneous once a day    CIPROFLOXACN 500MG TAB: 1 tab(s) orally every 12 hours for 7 more days   FEBUXOSTAT 80MG TAB: 1 tab(s) orally once a day  Insulin Pen Needles, 4mm: 1 application subcutaneously 4 times a day. ** Use with insulin pen **   metFORMIN 1000 mg oral tablet: 1 tab(s) orally 2 times a day   METOPROL SUC 100MG ER TAB: 1 tab(s) orally once a day  Semglee (Prefilled Pen) 100 units/mL subcutaneous solution: 18 unit(s) subcutaneous once a day    CIPROFLOXACN 500MG TAB: 1 tab(s) orally every 12 hours for 7 more days   FEBUXOSTAT 80MG TAB: 1 tab(s) orally once a day  Insulin Pen Needles, 4mm: 1 application subcutaneously 4 times a day. ** Use with insulin pen **   metFORMIN 1000 mg oral tablet: 1 tab(s) orally 2 times a day   METOPROL SUC 100MG ER TAB: 1 tab(s) orally once a day  Semglee (Prefilled Pen) 100 units/mL subcutaneous solution: 22 unit(s) subcutaneous once a day     If your fasting sugars in the morning consistently remain above 130 over the next week while out of the hospital please increase from 22U daily to 25U daily.  valsartan-hydrochlorothiazide 320 mg-12.5 mg oral tablet: 1 tab(s) orally once a day   CIPROFLOXACN 500MG TAB: 1 tab(s) orally every 12 hours for 7 more days   FEBUXOSTAT 80MG TAB: 1 tab(s) orally once a day  Insulin Pen Needles, 4mm: 1 application subcutaneously 4 times a day. ** Use with insulin pen **   metFORMIN 1000 mg oral tablet: 1 tab(s) orally 2 times a day   METOPROL SUC 100MG ER TAB: 1 tab(s) orally once a day  Semglee (Prefilled Pen) 100 units/mL subcutaneous solution: 22 unit(s) subcutaneous once a day     If your fasting sugars in the morning consistently remain above 130 over the next 3-4 days while out of the hospital please increase from 22U daily to 25U daily.  valsartan-hydrochlorothiazide 320 mg-12.5 mg oral tablet: 1 tab(s) orally once a day

## 2022-12-29 NOTE — DISCHARGE NOTE PROVIDER - NSDCCPCAREPLAN_GEN_ALL_CORE_FT
PRINCIPAL DISCHARGE DIAGNOSIS  Diagnosis: Sepsis secondary to UTI  Assessment and Plan of Treatment: You came to the hospital with chills, fevers and burning with urination. We believe it to be due to the urologic procedure you had done on the 22nd. You were started on antibiotics and your outpatient records showed E. coli growing in your urine. You had a CT done, which ______. You started to improve on antibiotics and we felt it safe for you to go home on oral antibitoics to finish. You will continue on ______ for ____ more days. If you have return of the burning sensation/pain, please come back to the hospital or follow back up with your primary care doctor.      SECONDARY DISCHARGE DIAGNOSES  Diagnosis: DKA (diabetic ketoacidosis)  Assessment and Plan of Treatment: You have a history of diabetes. When you came into the hospital your lab work was concerning for something called diabetic ketoacidosis. We believe it could have been caused by your infection or poor oral intake. The endocrine team was consulted to give recommendations on the best way to treat you. You were started on insulin and began to improve. The endocrine team recommended you to go home on ____. Please follow up with your endocrinologist after leaving the hospital.     PRINCIPAL DISCHARGE DIAGNOSIS  Diagnosis: Sepsis secondary to UTI  Assessment and Plan of Treatment: You came to the hospital with chills, fevers and burning with urination. We believe it to be due to the urologic procedure you had done on the 22nd. You were started on antibiotics and your outpatient records showed E. coli growing in your urine. You had a CT done, which did not show signs of prostatitis. You started to improve on antibiotics and we felt it safe for you to go home on oral antibitoics to finish. You will continue on your Ciprofloxacin prescription for 7 more days. If you have return of the burning sensation/pain, please come back to the hospital or follow back up with your primary care doctor.      SECONDARY DISCHARGE DIAGNOSES  Diagnosis: DKA (diabetic ketoacidosis)  Assessment and Plan of Treatment: You have a history of diabetes. When you came into the hospital your lab work was concerning for something called diabetic ketoacidosis. We believe it could have been caused by your infection or poor oral intake. The endocrine team was consulted to give recommendations on the best way to treat you. You were started on insulin and began to improve. The endocrine team recommended you to go home on long acting insulin daily 18 units and Metformin. PLEASE DO NOT TAKE STEGLATRO.  Please follow up with your endocrinologist after leaving the hospital.    Diagnosis: Hyponatremia  Assessment and Plan of Treatment: Your kidney numbers was a bit elevated and your sodium was low when you came in. We think this was due to dehydration and improved with IV fluids. Follow up with your primary care doctor and have him check a basic metabolic panel to check your sodium and kidney function    Diagnosis: Hypotension  Assessment and Plan of Treatment: Your blood pressures were low in the hospital. They improved with IV fluids. please do not take your blood pressure medications when you get home (Valsartan-hydrochlorothiazide)     PRINCIPAL DISCHARGE DIAGNOSIS  Diagnosis: Sepsis secondary to UTI  Assessment and Plan of Treatment: You came to the hospital with chills, fevers and burning with urination. We believe it to be due to the urologic procedure you had done on the 22nd. You were started on antibiotics and your outpatient records showed E. coli growing in your urine. You had a CT done, which did not show signs of prostatitis. You started to improve on antibiotics and we felt it safe for you to go home on oral antibitoics to finish. You will continue on your current Ciprofloxacin prescription for 7 more days. If you have return of the burning sensation/pain, please come back to the hospital or follow back up with your primary care doctor.      SECONDARY DISCHARGE DIAGNOSES  Diagnosis: DKA (diabetic ketoacidosis)  Assessment and Plan of Treatment: You have a history of diabetes. When you came into the hospital your lab work was concerning for something called diabetic ketoacidosis. We believe it could have been caused by your infection or poor oral intake. The endocrine team was consulted to give recommendations on the best way to treat you. You were started on insulin and began to improve. The endocrine team recommended you to go home on long acting insulin daily 22 units and Metformin. Check your fingersticks every morning for a fasting level. If after a few days of being out of the hospital you consistently remain above 130 for your fasting measurement please increase the lantus to 25U daily. ALSO, PLEASE DO NOT TAKE STEGLATRO.  Please follow up with your endocrinologist after leaving the hospital.    Diagnosis: Hyponatremia  Assessment and Plan of Treatment: Your kidney numbers was a bit elevated and your sodium was low when you came in. We think this was due to dehydration and improved with IV fluids. Follow up with your primary care doctor and have him check a basic metabolic panel to check your sodium and kidney function    Diagnosis: Hypotension  Assessment and Plan of Treatment: Your blood pressures were low in the hospital. They improved with IV fluids and have remained OK. You can continue your valsartan-HCTZ at home, and please continue to check your blood pressures. If your blood pressures are low stop taking the medication and discuss with your PCP     PRINCIPAL DISCHARGE DIAGNOSIS  Diagnosis: Sepsis secondary to UTI  Assessment and Plan of Treatment: You came to the hospital with chills, fevers and burning with urination. We believe it to be due to the urologic procedure you had done on the 22nd. You were started on antibiotics and your outpatient records showed E. coli growing in your urine. You had a CT done, which did not show signs of prostatitis. You started to improve on antibiotics and we felt it safe for you to go home on oral antibitoics to finish. You will continue on your current Ciprofloxacin prescription for 7 more days. If you have return of the burning sensation/pain, please come back to the hospital or follow back up with your primary care doctor.      SECONDARY DISCHARGE DIAGNOSES  Diagnosis: DKA (diabetic ketoacidosis)  Assessment and Plan of Treatment: You have a history of diabetes. When you came into the hospital your lab work was concerning for something called diabetic ketoacidosis. We believe it could have been caused by your infection or poor oral intake. The endocrine team was consulted to give recommendations on the best way to treat you. You were started on insulin and began to improve. The endocrine team recommended you to go home on long acting insulin daily 22 units and Metformin. Check your fingersticks every morning for a fasting level. If after a few days of being out of the hospital you consistently remain above 130 for your fasting measurement please increase the lantus to 25U daily. ALSO, PLEASE DO NOT TAKE STEGLATRO.  Please follow up with your endocrinologist after leaving the hospital.    Diagnosis: Hyponatremia  Assessment and Plan of Treatment: Your kidney numbers was a bit elevated and your sodium was low when you came in. We think this was due to dehydration and improved with IV fluids. Follow up with your primary care doctor and have him check a basic metabolic panel to check your sodium and kidney function    Diagnosis: Hypotension  Assessment and Plan of Treatment: Your blood pressures were low in the hospital. They improved with IV fluids and have remained OK. You can continue your valsartan-HCTZ at home, and please continue to check your blood pressures. If your blood pressures are low stop taking the medication and discuss with your PCP. If taking metoprolol today when you get home, take the Valsartan-HCTZ tomorrow, please don't take both together when restarting.

## 2022-12-29 NOTE — PROGRESS NOTE ADULT - PROBLEM SELECTOR PLAN 5
- 122 on admission, 127 on repeat  - Poor PO intake, but has normal serum osm, hyperglycemia, likely is pseudohyponatremia due to hypertonicity given hyperglycemia  - Sodium correcting above 130 based on glucose levels  - Will continue to monitor - Cr 1.21 as per outpatient labs from 8/2022  - Possibly pre-renal etiology versus ATN from hypotension/sepsis  - Trend Cr, dose meds by eGFR, avoid nephrotoxic agents  - Improving back towards baseline

## 2022-12-30 ENCOUNTER — TRANSCRIPTION ENCOUNTER (OUTPATIENT)
Age: 62
End: 2022-12-30

## 2022-12-30 VITALS
HEART RATE: 84 BPM | DIASTOLIC BLOOD PRESSURE: 80 MMHG | RESPIRATION RATE: 18 BRPM | SYSTOLIC BLOOD PRESSURE: 128 MMHG | OXYGEN SATURATION: 100 % | TEMPERATURE: 98 F

## 2022-12-30 LAB
ANION GAP SERPL CALC-SCNC: 12 MMOL/L — SIGNIFICANT CHANGE UP (ref 7–14)
B-OH-BUTYR SERPL-SCNC: 0.6 MMOL/L — HIGH (ref 0–0.4)
BASE EXCESS BLDV CALC-SCNC: -1.5 MMOL/L — SIGNIFICANT CHANGE UP (ref -2–3)
BLOOD GAS VENOUS COMPREHENSIVE RESULT: SIGNIFICANT CHANGE UP
BUN SERPL-MCNC: 21 MG/DL — SIGNIFICANT CHANGE UP (ref 7–23)
CALCIUM SERPL-MCNC: 9.1 MG/DL — SIGNIFICANT CHANGE UP (ref 8.4–10.5)
CHLORIDE BLDV-SCNC: 98 MMOL/L — SIGNIFICANT CHANGE UP (ref 96–108)
CHLORIDE SERPL-SCNC: 99 MMOL/L — SIGNIFICANT CHANGE UP (ref 98–107)
CO2 BLDV-SCNC: 26.7 MMOL/L — HIGH (ref 22–26)
CO2 SERPL-SCNC: 26 MMOL/L — SIGNIFICANT CHANGE UP (ref 22–31)
CREAT SERPL-MCNC: 1.12 MG/DL — SIGNIFICANT CHANGE UP (ref 0.5–1.3)
EGFR: 75 ML/MIN/1.73M2 — SIGNIFICANT CHANGE UP
GAS PNL BLDV: 132 MMOL/L — LOW (ref 136–145)
GLUCOSE BLDC GLUCOMTR-MCNC: 236 MG/DL — HIGH (ref 70–99)
GLUCOSE BLDC GLUCOMTR-MCNC: 244 MG/DL — HIGH (ref 70–99)
GLUCOSE BLDV-MCNC: 219 MG/DL — HIGH (ref 70–99)
GLUCOSE SERPL-MCNC: 236 MG/DL — HIGH (ref 70–99)
HCO3 BLDV-SCNC: 25 MMOL/L — SIGNIFICANT CHANGE UP (ref 22–29)
HCT VFR BLD CALC: 33.5 % — LOW (ref 39–50)
HCT VFR BLDA CALC: 33 % — LOW (ref 39–51)
HGB BLD CALC-MCNC: 11.1 G/DL — LOW (ref 13–17)
HGB BLD-MCNC: 10.4 G/DL — LOW (ref 13–17)
LACTATE BLDV-MCNC: 2.3 MMOL/L — HIGH (ref 0.5–2)
LACTATE SERPL-SCNC: 1.5 MMOL/L — SIGNIFICANT CHANGE UP (ref 0.5–2)
MAGNESIUM SERPL-MCNC: 1.5 MG/DL — LOW (ref 1.6–2.6)
MCHC RBC-ENTMCNC: 18.6 PG — LOW (ref 27–34)
MCHC RBC-ENTMCNC: 31 GM/DL — LOW (ref 32–36)
MCV RBC AUTO: 59.9 FL — LOW (ref 80–100)
NRBC # BLD: 0 /100 WBCS — SIGNIFICANT CHANGE UP (ref 0–0)
NRBC # FLD: 0 K/UL — SIGNIFICANT CHANGE UP (ref 0–0)
PCO2 BLDV: 50 MMHG — SIGNIFICANT CHANGE UP (ref 42–55)
PH BLDV: 7.31 — LOW (ref 7.32–7.43)
PHOSPHATE SERPL-MCNC: 4.4 MG/DL — SIGNIFICANT CHANGE UP (ref 2.5–4.5)
PLATELET # BLD AUTO: 182 K/UL — SIGNIFICANT CHANGE UP (ref 150–400)
PO2 BLDV: 36 MMHG — SIGNIFICANT CHANGE UP
POTASSIUM BLDV-SCNC: 4.3 MMOL/L — SIGNIFICANT CHANGE UP (ref 3.5–5.1)
POTASSIUM SERPL-MCNC: 5.1 MMOL/L — SIGNIFICANT CHANGE UP (ref 3.5–5.3)
POTASSIUM SERPL-SCNC: 5.1 MMOL/L — SIGNIFICANT CHANGE UP (ref 3.5–5.3)
RBC # BLD: 5.59 M/UL — SIGNIFICANT CHANGE UP (ref 4.2–5.8)
RBC # FLD: 16.1 % — HIGH (ref 10.3–14.5)
SAO2 % BLDV: 47.2 % — SIGNIFICANT CHANGE UP
SODIUM SERPL-SCNC: 137 MMOL/L — SIGNIFICANT CHANGE UP (ref 135–145)
TSH SERPL-MCNC: 2.99 UIU/ML — SIGNIFICANT CHANGE UP (ref 0.27–4.2)
WBC # BLD: 4.1 K/UL — SIGNIFICANT CHANGE UP (ref 3.8–10.5)
WBC # FLD AUTO: 4.1 K/UL — SIGNIFICANT CHANGE UP (ref 3.8–10.5)

## 2022-12-30 PROCEDURE — 99232 SBSQ HOSP IP/OBS MODERATE 35: CPT

## 2022-12-30 PROCEDURE — 99239 HOSP IP/OBS DSCHRG MGMT >30: CPT

## 2022-12-30 RX ORDER — MAGNESIUM SULFATE 500 MG/ML
2 VIAL (ML) INJECTION ONCE
Refills: 0 | Status: COMPLETED | OUTPATIENT
Start: 2022-12-30 | End: 2022-12-30

## 2022-12-30 RX ORDER — INSULIN LISPRO 100/ML
8 VIAL (ML) SUBCUTANEOUS
Refills: 0 | Status: DISCONTINUED | OUTPATIENT
Start: 2022-12-30 | End: 2022-12-31

## 2022-12-30 RX ORDER — INSULIN GLARGINE 100 [IU]/ML
22 INJECTION, SOLUTION SUBCUTANEOUS
Qty: 1 | Refills: 0
Start: 2022-12-30

## 2022-12-30 RX ORDER — SODIUM CHLORIDE 9 MG/ML
500 INJECTION, SOLUTION INTRAVENOUS ONCE
Refills: 0 | Status: COMPLETED | OUTPATIENT
Start: 2022-12-30 | End: 2022-12-30

## 2022-12-30 RX ADMIN — Medication 4: at 08:45

## 2022-12-30 RX ADMIN — Medication 6 UNIT(S): at 08:44

## 2022-12-30 RX ADMIN — SODIUM CHLORIDE 500 MILLILITER(S): 9 INJECTION, SOLUTION INTRAVENOUS at 08:43

## 2022-12-30 RX ADMIN — HEPARIN SODIUM 5000 UNIT(S): 5000 INJECTION INTRAVENOUS; SUBCUTANEOUS at 13:18

## 2022-12-30 RX ADMIN — HEPARIN SODIUM 5000 UNIT(S): 5000 INJECTION INTRAVENOUS; SUBCUTANEOUS at 05:28

## 2022-12-30 RX ADMIN — INSULIN GLARGINE 18 UNIT(S): 100 INJECTION, SOLUTION SUBCUTANEOUS at 13:17

## 2022-12-30 RX ADMIN — Medication 6 UNIT(S): at 13:17

## 2022-12-30 RX ADMIN — Medication 25 GRAM(S): at 08:43

## 2022-12-30 RX ADMIN — CEFTRIAXONE 100 MILLIGRAM(S): 500 INJECTION, POWDER, FOR SOLUTION INTRAMUSCULAR; INTRAVENOUS at 09:58

## 2022-12-30 RX ADMIN — Medication 4: at 13:17

## 2022-12-30 RX ADMIN — FEBUXOSTAT 80 MILLIGRAM(S): 40 TABLET ORAL at 13:18

## 2022-12-30 NOTE — PROGRESS NOTE ADULT - PROBLEM SELECTOR PLAN 3
- Patient with history of DMII on home metformin, ertugliflozin  - Found to have elevated anion gap, glucose, BHB, small ketones on urinalysis, decreased bicarb, with borderline pH on VBG  - Patient endorses being compliant with home meds, no endorsement of chest pain and EKG appears non-ischemic, could possibly be in setting of his likely UTI  - Endo consulted, MICU consulted  - S/P 3L IVF in ED  - AG had opened up this AM and patient was put back on NPO, 14U lantus and q4hr moderate ISS  - BMP, VBG, BHB q4  - Replete electrolytes as needed  - s/p AG closure for resumption of diet then onto 6U pre-meal with low ISS pre meal and bedtime and 14U lantus  - Endo will give recommendations upon DC, Lantus 18u with Metformin   - Diabetes teaching complete - Patient with history of DMII on home metformin, ertugliflozin  - Found to have elevated anion gap, glucose, BHB, small ketones on urinalysis, decreased bicarb, with borderline pH on VBG  - Patient endorses being compliant with home meds, no endorsement of chest pain and EKG appears non-ischemic, could possibly be in setting of his likely UTI  - Endo consulted, MICU consulted  - S/P 3L IVF in ED  - AG had opened up this AM and patient was put back on NPO, 14U lantus and q4hr moderate ISS  - BMP, VBG, BHB q4  - Replete electrolytes as needed  - S/P AG closure for resumption of diet then onto 6U pre-meal with low ISS pre meal and bedtime and 14U lantus  - Endo recommending DC on Lantus 22U with plan to increase to 25U if fasting AM sugars remain elevated, and on Metformin   - Diabetes teaching complete

## 2022-12-30 NOTE — PROGRESS NOTE ADULT - SUBJECTIVE AND OBJECTIVE BOX
HPI:  62 year old man with history of DM2 on metformin ertugliflozin, gout on febuxostat, HTN on metoprolol and Valsartan-HCTZ presenting with burning with urination since Thursday (12/22). In the ED, patient had hypotension to 81/59 with HR of 89, temp of 100F, saturating well on RA. No leukocytosis on labs, hemoglobin of 10.2. Hyponatremic down to 122 with elevated glucose to 351, Cr elevated to 2.16. AG of 18 with elevated BHB, and lactate, non-acidotic on VBG. Small ketones, negative nitrite, moderate leuk esterase on UA. CXR clear. Patient received 3L IVF, was given dose of 1g ceftriaxone. Was not started on an insulin drip. Endocrine consulted for DM2 and DKA, which has resolved    interval hx:  glucoses elevated  bicarb 26, AG 12, egfr 75  pt feels well and is ready to go home    MEDICATIONS  (STANDING):  cefTRIAXone   IVPB 1000 milliGRAM(s) IV Intermittent every 24 hours  dextrose 5%. 1000 milliLiter(s) (100 mL/Hr) IV Continuous <Continuous>  dextrose 5%. 1000 milliLiter(s) (50 mL/Hr) IV Continuous <Continuous>  dextrose 50% Injectable 25 Gram(s) IV Push once  dextrose 50% Injectable 12.5 Gram(s) IV Push once  dextrose 50% Injectable 25 Gram(s) IV Push once  febuxostat 80 milliGRAM(s) Oral daily  glucagon  Injectable 1 milliGRAM(s) IntraMuscular once  heparin   Injectable 5000 Unit(s) SubCutaneous every 8 hours  insulin glargine Injectable (LANTUS) 18 Unit(s) SubCutaneous <User Schedule>  insulin lispro (ADMELOG) corrective regimen sliding scale   SubCutaneous three times a day before meals  insulin lispro (ADMELOG) corrective regimen sliding scale   SubCutaneous at bedtime  insulin lispro Injectable (ADMELOG) 6 Unit(s) SubCutaneous three times a day before meals  melatonin 3 milliGRAM(s) Oral at bedtime    MEDICATIONS  (PRN):  dextrose Oral Gel 15 Gram(s) Oral once PRN Blood Glucose LESS THAN 70 milliGRAM(s)/deciliter      Allergies    No Known Allergies    Intolerances        Review of Systems:  Constitutional: No fever, good appetite/po intake  Eyes: No blurry vision, diplopia  Neuro: No tremors  HEENT: No pain  Cardiovascular: No chest pain, palpitations  Respiratory: No SOB, no cough  GI: No nausea, vomiting,   : No dysuria, hematuria  Skin: no rash  Psych: no depression  Endocrine: no polyuria, polydipsia  Hem/lymph: no swelling  Osteoporosis: no fractures    ALL OTHER SYSTEMS REVIEWED AND NEGATIVE    PHYSICAL EXAM:  VITALS: T(C): 36.8 (12-30-22 @ 14:49)  T(F): 98.3 (12-30-22 @ 14:49), Max: 98.8 (12-30-22 @ 05:28)  HR: 84 (12-30-22 @ 14:49) (79 - 84)  BP: 128/80 (12-30-22 @ 14:49) (128/80 - 128/85)  RR:  (18 - 18)  SpO2:  (100% - 100%)  Wt(kg): --  GENERAL: NAD, well-groomed, well-developed  EYES: No proptosis, no lid lag, anicteric  HEENT:  Atraumatic, normocephalic, moist mucous membranes  RESPIRATORY: nonlabored respirations, no wheezing  PSYCH: Alert and oriented x 3, normal affect, normal mood    POCT Blood Glucose.: 236 mg/dL (12-30-22 @ 12:38)  POCT Blood Glucose.: 244 mg/dL (12-30-22 @ 08:44)  POCT Blood Glucose.: 230 mg/dL (12-29-22 @ 22:25)  POCT Blood Glucose.: 248 mg/dL (12-29-22 @ 17:49)  POCT Blood Glucose.: 338 mg/dL (12-29-22 @ 12:33)  POCT Blood Glucose.: 273 mg/dL (12-29-22 @ 09:01)  POCT Blood Glucose.: 278 mg/dL (12-28-22 @ 21:54)  POCT Blood Glucose.: 215 mg/dL (12-28-22 @ 17:42)  POCT Blood Glucose.: 176 mg/dL (12-28-22 @ 14:21)  POCT Blood Glucose.: 207 mg/dL (12-28-22 @ 13:04)  POCT Blood Glucose.: 203 mg/dL (12-28-22 @ 08:46)  POCT Blood Glucose.: 214 mg/dL (12-27-22 @ 22:09)  POCT Blood Glucose.: 286 mg/dL (12-27-22 @ 19:29)  POCT Blood Glucose.: 284 mg/dL (12-27-22 @ 17:48)  POCT Blood Glucose.: 276 mg/dL (12-27-22 @ 16:58)                            10.4   4.10  )-----------( 182      ( 30 Dec 2022 06:40 )             33.5       12-30    137  |  99  |  21  ----------------------------<  236<H>  5.1   |  26  |  1.12    eGFR: 75    Ca    9.1      12-30  Mg     1.50     12-30  Phos  4.4     12-30        Thyroid Function Tests:  12-30 @ 06:40 TSH 2.99 FreeT4 -- T3 -- Anti TPO -- Anti Thyroglobulin Ab -- TSI --          Radiology:

## 2022-12-30 NOTE — PROGRESS NOTE ADULT - PROBLEM SELECTOR PLAN 9
- On Metformin and Ertugliflozin at home. Holding iso of hospitalization and DKA  - Treating for DKA currently, 18U Lantus and then moderate correctional admelog q4hr  - Endo following  - Last A1C in 2022 per outpatient record: 9.2  - DC planninu Lantus and Metformin  - Will not dc on SGLT2-I   - s/p diabetic teaching - On Metformin and Ertugliflozin at home. Holding iso of hospitalization and DKA  - Treating for DKA currently, 18U Lantus and then moderate correctional admelog q4hr  - Endo following  - Last A1C in 2022 per outpatient record: 9.2  - DC planninU Lantus (increase to 25U if AM fasting sugars remain high) and Metformin  - Will not dc on SGLT2-I   - S/P diabetic teaching

## 2022-12-30 NOTE — PROGRESS NOTE ADULT - REASON FOR ADMISSION
Sepsis, UTI versus Prostatitis

## 2022-12-30 NOTE — DISCHARGE NOTE NURSING/CASE MANAGEMENT/SOCIAL WORK - PATIENT PORTAL LINK FT
You can access the FollowMyHealth Patient Portal offered by [facility] by registering at the following website: [email or web address]. By joining [facility]’s FollowMyHealth portal, you will also be able to view your health information using other applications (apps) compatible with our system.

## 2022-12-30 NOTE — PROGRESS NOTE ADULT - ASSESSMENT
62 year old man with history of DMII on metformin ertugliflozin, gout on febuxostat, HTN on metoprolol and Valsartan-HCTZ presenting with burning with urination since Thursday (12/22). Had cystoscopy 12/22 with start of symptoms following with moderate Leuk esterase on urinalysis but negative nitrite, 13 WBC/hpf, concerning for UTI. Admitted for sepsis due to likely urinary source with course complicated by DKA with elevated anion gap, glucose, BHB, small ketones on urinalysis, decreased bicarb, borderline pH on VBG on urinalysis. 62 year old man with history of DMII on metformin ertugliflozin, gout on febuxostat, HTN on metoprolol and Valsartan-HCTZ presenting with burning with urination since Thursday (12/22). Had cystoscopy 12/22 with start of symptoms following with moderate Leuk esterase on urinalysis but negative nitrite, 13 WBC/hpf, concerning for UTI. Admitted for sepsis due to likely urinary source with course complicated by DKA with elevated anion gap, glucose, BHB, small ketones on urinalysis, decreased bicarb, borderline pH on VBG on urinalysis. Improved and on lantus therapy, DC home today.

## 2022-12-30 NOTE — PROGRESS NOTE ADULT - SUBJECTIVE AND OBJECTIVE BOX
Medicine Progress Note    Jorge Wynn MD  PGY1 Internal Medicine  Available on TEAMS  [facility]: [phone], [facility]: [id number]    Patient is a 62y old  Male who presents with a chief complaint of Sepsis, UTI versus Prostatitis (29 Dec 2022 17:02)      SUBJECTIVE / OVERNIGHT EVENTS: This AM, patient seen and examined at bedside.      MEDICATIONS  (STANDING):  cefTRIAXone   IVPB 1000 milliGRAM(s) IV Intermittent every 24 hours  dextrose 5%. 1000 milliLiter(s) (100 mL/Hr) IV Continuous <Continuous>  dextrose 5%. 1000 milliLiter(s) (50 mL/Hr) IV Continuous <Continuous>  dextrose 50% Injectable 25 Gram(s) IV Push once  dextrose 50% Injectable 12.5 Gram(s) IV Push once  dextrose 50% Injectable 25 Gram(s) IV Push once  febuxostat 80 milliGRAM(s) Oral daily  glucagon  Injectable 1 milliGRAM(s) IntraMuscular once  heparin   Injectable 5000 Unit(s) SubCutaneous every 8 hours  insulin glargine Injectable (LANTUS) 18 Unit(s) SubCutaneous <User Schedule>  insulin lispro (ADMELOG) corrective regimen sliding scale   SubCutaneous three times a day before meals  insulin lispro (ADMELOG) corrective regimen sliding scale   SubCutaneous at bedtime  insulin lispro Injectable (ADMELOG) 6 Unit(s) SubCutaneous three times a day before meals  melatonin 3 milliGRAM(s) Oral at bedtime    MEDICATIONS  (PRN):  dextrose Oral Gel 15 Gram(s) Oral once PRN Blood Glucose LESS THAN 70 milliGRAM(s)/deciliter    CAPILLARY BLOOD GLUCOSE      POCT Blood Glucose.: 230 mg/dL (29 Dec 2022 22:25)  POCT Blood Glucose.: 248 mg/dL (29 Dec 2022 17:49)  POCT Blood Glucose.: 338 mg/dL (29 Dec 2022 12:33)  POCT Blood Glucose.: 273 mg/dL (29 Dec 2022 09:01)    I&O's Summary      PHYSICAL EXAM:  Vital Signs Last 24 Hrs  T(C): 37.1 (29 Dec 2022 14:22), Max: 37.1 (29 Dec 2022 14:22)  T(F): 98.8 (29 Dec 2022 14:22), Max: 98.8 (29 Dec 2022 14:22)  HR: 84 (29 Dec 2022 14:22) (84 - 84)  BP: 148/91 (29 Dec 2022 14:22) (148/91 - 148/91)  BP(mean): --  RR: 18 (29 Dec 2022 14:22) (18 - 18)  SpO2: 100% (29 Dec 2022 14:22) (100% - 100%)    Parameters below as of 29 Dec 2022 14:22  Patient On (Oxygen Delivery Method): room air      CONSTITUTIONAL: NAD, well-developed, well-groomed  HEENT: Moist oral mucosa, no pharyngeal injection or exudates  RESPIRATORY: Normal respiratory effort; lungs are clear to auscultation bilaterally  CARDIOVASCULAR: Regular rate and rhythm, normal S1 and S2, no murmur/rub/gallop, no lower extremity edema, peripheral pulses are 2+ bilaterally  ABDOMEN: Soft, nondistended, nontender to palpation, normoactive bowel sounds, no rebound/guarding  PSYCH: A+O to person, place, and time  NEUROLOGY: Facial expression appears symmetric, no gross sensory deficits appreciated, moving all extremities spontaneously  SKIN: No rashes; no palpable lesions    LABS:                        11.9   4.08  )-----------( 184      ( 29 Dec 2022 05:20 )             37.9     12-29    132<L>  |  96<L>  |  22  ----------------------------<  364<H>  4.5   |  20<L>  |  1.21    Ca    9.2      29 Dec 2022 10:35  Phos  3.4     12-29  Mg     1.50     12-29      10:35 - VBG - pH: 7.41  | pCO2: 35    | pO2: 67    | Lactate: 1.4                Culture - Urine (collected 27 Dec 2022 09:36)  Source: Clean Catch Clean Catch (Midstream)  Final Report (28 Dec 2022 09:45):    <10,000 CFU/mL Normal Urogenital Geraldine      SARS-CoV-2: NotDetec (27 Dec 2022 06:51)      RADIOLOGY & ADDITIONAL TESTS:  Imaging from Last 24 Hours: Medicine Progress Note    Jorge Wynn MD  PGY1 Internal Medicine  Available on TEAMS  [facility]: [phone], [facility]: [phone]    Patient is a 62y old  Male who presents with a chief complaint of Sepsis, UTI versus Prostatitis (29 Dec 2022 17:02)      SUBJECTIVE / OVERNIGHT EVENTS: RACHEL. This AM, patient seen and examined at bedside. No acute complaints, tolerating PO. No burning with urination.      MEDICATIONS  (STANDING):  cefTRIAXone   IVPB 1000 milliGRAM(s) IV Intermittent every 24 hours  dextrose 5%. 1000 milliLiter(s) (100 mL/Hr) IV Continuous <Continuous>  dextrose 5%. 1000 milliLiter(s) (50 mL/Hr) IV Continuous <Continuous>  dextrose 50% Injectable 25 Gram(s) IV Push once  dextrose 50% Injectable 12.5 Gram(s) IV Push once  dextrose 50% Injectable 25 Gram(s) IV Push once  febuxostat 80 milliGRAM(s) Oral daily  glucagon  Injectable 1 milliGRAM(s) IntraMuscular once  heparin   Injectable 5000 Unit(s) SubCutaneous every 8 hours  insulin glargine Injectable (LANTUS) 18 Unit(s) SubCutaneous <User Schedule>  insulin lispro (ADMELOG) corrective regimen sliding scale   SubCutaneous three times a day before meals  insulin lispro (ADMELOG) corrective regimen sliding scale   SubCutaneous at bedtime  insulin lispro Injectable (ADMELOG) 6 Unit(s) SubCutaneous three times a day before meals  melatonin 3 milliGRAM(s) Oral at bedtime    MEDICATIONS  (PRN):  dextrose Oral Gel 15 Gram(s) Oral once PRN Blood Glucose LESS THAN 70 milliGRAM(s)/deciliter    CAPILLARY BLOOD GLUCOSE      POCT Blood Glucose.: 230 mg/dL (29 Dec 2022 22:25)  POCT Blood Glucose.: 248 mg/dL (29 Dec 2022 17:49)  POCT Blood Glucose.: 338 mg/dL (29 Dec 2022 12:33)  POCT Blood Glucose.: 273 mg/dL (29 Dec 2022 09:01)    I&O's Summary      PHYSICAL EXAM:  Vital Signs Last 24 Hrs  T(C): 37.1 (29 Dec 2022 14:22), Max: 37.1 (29 Dec 2022 14:22)  T(F): 98.8 (29 Dec 2022 14:22), Max: 98.8 (29 Dec 2022 14:22)  HR: 84 (29 Dec 2022 14:22) (84 - 84)  BP: 148/91 (29 Dec 2022 14:22) (148/91 - 148/91)  BP(mean): --  RR: 18 (29 Dec 2022 14:22) (18 - 18)  SpO2: 100% (29 Dec 2022 14:22) (100% - 100%)    Parameters below as of 29 Dec 2022 14:22  Patient On (Oxygen Delivery Method): room air      CONSTITUTIONAL: NAD  HEENT: Moist oral mucosa, no pharyngeal injection or exudates  RESPIRATORY: Normal respiratory effort; lungs are clear to auscultation bilaterally  CARDIOVASCULAR: Regular rate and rhythm, normal S1 and S2, no murmur/rub/gallop, no lower extremity edema, peripheral pulses are 2+ bilaterally  ABDOMEN: Soft, nondistended, nontender to palpation, normoactive bowel sounds, no rebound/guarding  PSYCH: A+O to person, place, and time  NEUROLOGY: Facial expression appears symmetric, no gross sensory deficits appreciated, moving all extremities spontaneously  SKIN: No rashes; no palpable lesions    LABS:                        11.9   4.08  )-----------( 184      ( 29 Dec 2022 05:20 )             37.9     12-29    132<L>  |  96<L>  |  22  ----------------------------<  364<H>  4.5   |  20<L>  |  1.21    Ca    9.2      29 Dec 2022 10:35  Phos  3.4     12-29  Mg     1.50     12-29      10:35 - VBG - pH: 7.41  | pCO2: 35    | pO2: 67    | Lactate: 1.4                Culture - Urine (collected 27 Dec 2022 09:36)  Source: Clean Catch Clean Catch (Midstream)  Final Report (28 Dec 2022 09:45):    <10,000 CFU/mL Normal Urogenital Geraldine      SARS-CoV-2: NotDetec (27 Dec 2022 06:51)      RADIOLOGY & ADDITIONAL TESTS:  Imaging from Last 24 Hours:

## 2022-12-30 NOTE — PROGRESS NOTE ADULT - ASSESSMENT
62 year old man with history of DM2 on metformin ertugliflozin, gout on febuxostat, HTN on metoprolol and Valsartan-HCTZ presenting with burning with urination since Thursday (12/22). In the ED, patient had hypotension to 81/59 with HR of 89, temp of 100F, saturating well on RA. No leukocytosis on labs, hemoglobin of 10.2. Hyponatremic down to 122 with elevated glucose to 351, Cr elevated to 2.16. AG of 18 with elevated BHB, and lactate, non-acidotic on VBG. Small ketones, negative nitrite, moderate leuk esterase on UA. CXR clear. Patient received 3L IVF, was given dose of 1g ceftriaxone. Was not started on an insulin drip. Endocrine consulted for DM2 and DKA.    Poorly controlled T2DM with hyperglycemia and DKA  DM diagnosis: 15 years ago  Last A1c: 9.0  Endocrinologist: Dr. Yesenia Fiore in [facility] DM meds: Steglatro 15mg daily and metformin 1g BID   -Hold oral DM agents while inpatient  -AG12, bicarb 26  Increase lantus to 22 units (continue dose on discharge)  Admelog 8 units if inpatient  moderate scale premeal and moderate bedtime  -Goal -180 mg/dl  -RD consult  -Insulin pen teaching donee    Discharge plan:    -lantus 22 units QHS + metformin 1000mg BID (stop steglatro). If fasting glucoses >130, pt to increase lantus to 25 units.  Will need BD mario pen needles  ensure patient has glucometer and supplies  -Recommend routine outpatient ophthalmology and endocrinology f/u. Can f/u with Dr. Fiore for Endocrine.    HTN  -Outpatient goal BP <130/80. BP well controlled on no HTN meds. Management per primary team.    HLD  -Would likely benefit from a statin if no contraindication  - outpatient lipids    Discussed with team    Jewels Florian MD  Attending Physician   Department of Endocrinology, Diabetes and Metabolism   Pager: [phone] or [employer] Teams on 12-30-22 @ 15:18    If before 9AM or after 5PM, or on weekends/holidays, please call the Endocrine answering service for assistance ([phone]).  For nonurgent matters, please email [email or web address] for assistance.

## 2022-12-30 NOTE — PROGRESS NOTE ADULT - PROVIDER SPECIALTY LIST ADULT
Endocrinology
Cardiology
Internal Medicine
Endocrinology
Endocrinology
Internal Medicine
Internal Medicine

## 2022-12-30 NOTE — PROGRESS NOTE ADULT - ATTENDING COMMENTS
Patient seen and examined by me. Case discussed with resident and agree with the resident's findings and plan as documented in the resident's note. 62M h/o DM-II on metformin/ertugliflozin, gout on febuxostat, HTN on metoprolol and Valsartan-HCTZ presenting with burning with urination since Thursday 12/22 s/p cystoscopy 12/21 a/w sepsis 2/2 UTI course c/b DKA.    1. Sepsis 2/2 UTI   -sepsis resolving  -urine cx NGTD in house   -CT a/p no evidence of prostatitis just cystitis  -c/w ceftriaxone IV day # 4/10 - Transition to cipro on dc to complete total 10 day course  -outpatient urine culture >100K Ecoli sensitive to cipro  -f/u PCP in 1 week     2. Hypotension likely 2/2 sepsis:  -resolved  -can resume home Metoprolol and Valsartan-Hctz on discharge   -F/u pcp for medication adjustment in 1 week     3. DKA - resolved :  -resolved   -YtF6h=9.0%; holding home DM meds  -currently on lantus 18u QHS and pre-meal 6u qAC   -f/u endo recs for discharge     4. Dispo:  -possible discharge home today if endo ok  -see discharge summary in sunrise  -time spent discharging patient =39min Patient seen and examined by me. Case discussed with resident and agree with the resident's findings and plan as documented in the resident's note. 62M h/o DM-II on metformin/ertugliflozin, gout on febuxostat, HTN on metoprolol and Valsartan-HCTZ presenting with burning with urination since Thursday 12/22 s/p cystoscopy 12/21 a/w sepsis 2/2 UTI course c/b DKA.    1. Sepsis 2/2 UTI   -sepsis resolving  -urine cx NGTD in house   -CT a/p no evidence of prostatitis just cystitis  -c/w ceftriaxone IV day # 4/10 - Transition to cipro on dc to complete total 10 day course  -outpatient urine culture >100K Ecoli sensitive to cipro  -f/u PCP in 1 week     2. Hypotension likely 2/2 sepsis:  -resolved  -can resume home Metoprolol and Valsartan-Hctz on discharge   -F/u pcp for medication adjustment in 1 week     3. DKA - resolved :  -resolved   -IoI4u=8.0%; holding home DM meds  -currently on lantus 18u QHS and pre-meal 6u qAC   -f/u endo recs for discharge: lantus 22units qAC, metformin 1000mg BID; if fasting FS >130 in the next few days can increase lantus to 25units and f/u with outpatient Endocrinologist    4. Hypomagnesemia:  -will replete today     5. Dispo:  -possible discharge home   -see discharge summary in sunrise  -time spent discharging patient =39min Patient seen and examined by me. Case discussed with resident and agree with the resident's findings and plan as documented in the resident's note. 62M h/o DM-II on metformin/ertugliflozin, gout on febuxostat, HTN on metoprolol and Valsartan-HCTZ presenting with burning with urination since Thursday 12/22 s/p cystoscopy 12/21 a/w sepsis 2/2 UTI course c/b DKA.    1. Sepsis 2/2 UTI   -sepsis resolving  -urine cx NGTD in house   -CT a/p no evidence of prostatitis just cystitis  -c/w ceftriaxone IV day # 4/10 - Transition to cipro on dc to complete total 10 day course  -outpatient urine culture >100K Ecoli sensitive to cipro  -f/u PCP in 1 week     2. Hypotension likely 2/2 sepsis:  -resolved  -can resume home Metoprolol and Valsartan-Hctz on discharge   -F/u pcp for medication adjustment in 1 week     3. DKA - resolved :  -resolved   -KqZ1t=7.0%; holding home DM meds  -currently on lantus 18u QHS and pre-meal 6u qAC   -f/u endo recs for discharge: lantus 22units qAC, metformin 1000mg BID; if fasting FS >130 in the next few days can increase lantus to 25units and f/u with outpatient Endocrinologist    4. Hypomagnesemia:  -will replete today     5. Dispo:  -possible discharge home   -see discharge summary in sunrise  -time spent discharging patient =39min.

## 2022-12-30 NOTE — PROGRESS NOTE ADULT - PROBLEM SELECTOR PLAN 6
- 122 on admission, 127 on repeat  - Poor PO intake, but has normal serum osm, hyperglycemia, likely is pseudohyponatremia due to hypertonicity given hyperglycemia  - Sodium correcting above 130 based on glucose levels  - urine studies sent today: urine Na, osm,   - Will continue to monitor

## 2022-12-30 NOTE — PROGRESS NOTE ADULT - PROBLEM SELECTOR PLAN 5
- Cr 1.21 as per outpatient labs from 8/2022  - Possibly pre-renal etiology versus ATN from hypotension/sepsis  - Trend Cr, dose meds by eGFR, avoid nephrotoxic agents  - Improving back towards baseline

## 2022-12-30 NOTE — PROGRESS NOTE ADULT - PROBLEM SELECTOR PLAN 4
- Likely due to outpatient cystoscopy done on 12/22, especially given patient endorsement that symptoms started following that procedure  - Had been seen at his PCP on Monday 12/26/22 and had urine studies sent, was started on Cipro 500mg BID  - UA here with moderate leuk esterase, negative nitrite, 13 WBC/hpf  - S/P dose of ceftriaxone in the ED, will continue on ceftriaxone  - Urine culture here no growth, likely due to cipro patient was taking  - Outpatient culture growing >100k E.coli, pansensitive   - CT ap no signs of prostatitis   - Plan to DC on 7 days of Ciprofloxacin to complete 10d course

## 2023-01-01 LAB
CULTURE RESULTS: SIGNIFICANT CHANGE UP
CULTURE RESULTS: SIGNIFICANT CHANGE UP
SPECIMEN SOURCE: SIGNIFICANT CHANGE UP
SPECIMEN SOURCE: SIGNIFICANT CHANGE UP

## 2023-01-06 LAB
HSCRP: > 60 MG/L — SIGNIFICANT CHANGE UP
Lab: 10.9 PG/ML — SIGNIFICANT CHANGE UP
Lab: 128 NG/ML — SIGNIFICANT CHANGE UP
Lab: 280 NG/ML — SIGNIFICANT CHANGE UP
Lab: 4.5 NG/ML — SIGNIFICANT CHANGE UP
Lab: 4033 NG/ML — SIGNIFICANT CHANGE UP
Lab: 43 — SIGNIFICANT CHANGE UP
Lab: 59 NG/ML — SIGNIFICANT CHANGE UP
Lab: 6 NG/ML — SIGNIFICANT CHANGE UP
Lab: 6.5 PG/ML — SIGNIFICANT CHANGE UP
Lab: 68 NG/ML — SIGNIFICANT CHANGE UP
Lab: 72 NG/ML — SIGNIFICANT CHANGE UP
Lab: 8.2 NG/ML — SIGNIFICANT CHANGE UP
Lab: SIGNIFICANT CHANGE UP
PROMETHEUS LABORATORY FOOTER: SIGNIFICANT CHANGE UP
SAA PROMETHEUS: > 181.6 MG/L — SIGNIFICANT CHANGE UP

## 2023-01-31 ENCOUNTER — FORM ENCOUNTER (OUTPATIENT)
Age: 63
End: 2023-01-31

## 2023-03-24 ENCOUNTER — NON-APPOINTMENT (OUTPATIENT)
Age: 63
End: 2023-03-24

## 2023-06-21 ENCOUNTER — APPOINTMENT (OUTPATIENT)
Dept: UROLOGY | Facility: CLINIC | Age: 63
End: 2023-06-21

## 2023-10-04 ENCOUNTER — RESULT CHARGE (OUTPATIENT)
Age: 63
End: 2023-10-04

## 2023-10-05 ENCOUNTER — LABORATORY RESULT (OUTPATIENT)
Age: 63
End: 2023-10-05

## 2023-10-05 ENCOUNTER — APPOINTMENT (OUTPATIENT)
Dept: OTHER | Facility: CLINIC | Age: 63
End: 2023-10-05
Payer: COMMERCIAL

## 2023-10-05 VITALS
DIASTOLIC BLOOD PRESSURE: 69 MMHG | BODY MASS INDEX: 24.86 KG/M2 | TEMPERATURE: 98 F | OXYGEN SATURATION: 99 % | HEART RATE: 63 BPM | SYSTOLIC BLOOD PRESSURE: 106 MMHG | WEIGHT: 164 LBS | HEIGHT: 68 IN | RESPIRATION RATE: 18 BRPM

## 2023-10-05 PROCEDURE — 99396 PREV VISIT EST AGE 40-64: CPT | Mod: 25

## 2023-10-05 PROCEDURE — 94010 BREATHING CAPACITY TEST: CPT

## 2023-10-05 RX ORDER — ERTUGLIFLOZIN 5 MG/1
0 TABLET, FILM COATED ORAL
Qty: 0 | Refills: 0 | DISCHARGE

## 2023-10-05 RX ORDER — ASPIRIN 81 MG
81 TABLET, DELAYED RELEASE (ENTERIC COATED) ORAL
Refills: 0 | Status: COMPLETED | COMMUNITY
End: 2023-10-05

## 2023-10-05 RX ORDER — METOPROLOL SUCCINATE 100 MG/1
100 TABLET, EXTENDED RELEASE ORAL DAILY
Refills: 0 | Status: ACTIVE | COMMUNITY

## 2023-10-05 RX ORDER — METOPROLOL TARTRATE 50 MG
1 TABLET ORAL
Qty: 0 | Refills: 0 | DISCHARGE

## 2023-10-05 RX ORDER — METOPROLOL TARTRATE 50 MG
0 TABLET ORAL
Qty: 0 | Refills: 0 | DISCHARGE

## 2023-10-05 RX ORDER — FEBUXOSTAT 40 MG/1
1 TABLET ORAL
Qty: 0 | Refills: 0 | DISCHARGE

## 2023-10-05 RX ORDER — VALSARTAN AND HYDROCHLOROTHIAZIDE 320; 12.5 MG/1; MG/1
320-12.5 TABLET, FILM COATED ORAL DAILY
Refills: 0 | Status: ACTIVE | COMMUNITY
Start: 2023-10-05

## 2023-10-05 RX ORDER — FEBUXOSTAT 40 MG/1
0 TABLET ORAL
Qty: 0 | Refills: 0 | DISCHARGE

## 2023-10-05 RX ORDER — CIPROFLOXACIN LACTATE 400MG/40ML
0 VIAL (ML) INTRAVENOUS
Qty: 0 | Refills: 0 | DISCHARGE

## 2023-10-12 LAB
ALBUMIN SERPL ELPH-MCNC: 4.6 G/DL
ALP BLD-CCNC: 71 U/L
ALT SERPL-CCNC: 28 U/L
ANION GAP SERPL CALC-SCNC: 19 MMOL/L
APPEARANCE: CLEAR
AST SERPL-CCNC: 25 U/L
BACTERIA: NEGATIVE /HPF
BASOPHILS # BLD AUTO: 0.05 K/UL
BASOPHILS NFR BLD AUTO: 0.8 %
BILIRUB SERPL-MCNC: 0.9 MG/DL
BILIRUBIN URINE: NEGATIVE
BLOOD URINE: NEGATIVE
BUN SERPL-MCNC: 29 MG/DL
CALCIUM SERPL-MCNC: 10.3 MG/DL
CAST: 0 /LPF
CHLORIDE SERPL-SCNC: 98 MMOL/L
CHOLEST SERPL-MCNC: 207 MG/DL
CO2 SERPL-SCNC: 20 MMOL/L
COLOR: YELLOW
CREAT SERPL-MCNC: 1.42 MG/DL
EGFR: 56 ML/MIN/1.73M2
EOSINOPHIL # BLD AUTO: 0.17 K/UL
EOSINOPHIL NFR BLD AUTO: 2.6 %
EPITHELIAL CELLS: 0 /HPF
GLUCOSE QUALITATIVE U: >=1000 MG/DL
GLUCOSE SERPL-MCNC: 167 MG/DL
HCT VFR BLD CALC: 43.4 %
HDLC SERPL-MCNC: 46 MG/DL
HGB BLD-MCNC: 13 G/DL
KETONES URINE: NEGATIVE MG/DL
LDLC SERPL CALC-MCNC: 133 MG/DL
LEUKOCYTE ESTERASE URINE: NEGATIVE
LYMPHOCYTES # BLD AUTO: 1.72 K/UL
LYMPHOCYTES NFR BLD AUTO: 25.9 %
MAN DIFF?: NORMAL
MCHC RBC-ENTMCNC: 19.5 PG
MCHC RBC-ENTMCNC: 30 GM/DL
MCV RBC AUTO: 65 FL
MICROSCOPIC-UA: NORMAL
MONOCYTES # BLD AUTO: 0.29 K/UL
MONOCYTES NFR BLD AUTO: 4.3 %
NEUTROPHILS # BLD AUTO: 4.34 K/UL
NEUTROPHILS NFR BLD AUTO: 65.5 %
NITRITE URINE: NEGATIVE
NONHDLC SERPL-MCNC: 161 MG/DL
PH URINE: 5.5
PLATELET # BLD AUTO: 237 K/UL
POTASSIUM SERPL-SCNC: 4.6 MMOL/L
PROT SERPL-MCNC: 7.4 G/DL
PROTEIN URINE: NEGATIVE MG/DL
RBC # BLD: 6.68 M/UL
RBC # FLD: 20.5 %
RED BLOOD CELLS URINE: 0 /HPF
SODIUM SERPL-SCNC: 138 MMOL/L
SPECIFIC GRAVITY URINE: 1.02
TRIGL SERPL-MCNC: 154 MG/DL
UROBILINOGEN URINE: 0.2 MG/DL
WBC # FLD AUTO: 6.63 K/UL
WHITE BLOOD CELLS URINE: 0 /HPF

## 2023-10-31 NOTE — PROGRESS NOTE ADULT - PROBLEM SELECTOR PLAN 2
- Borderline SIRS criteria with his heart rate, had fevers as outpatient, 100F here  - Likely urinary source iso patient's history and data  - Monitor for further fevers  - Hypotensive on admission, resolved with IVF, on maintenance fluids  - Blood cx negative  - urine cx negative  - outpatient urine cx growing E Coli, sensitivities resulted   - C/W ceftriaxone for now given likely urinary source  - DC on Ciprofloxacin 7d  -s/p CT ap no signs of prostatitis Area M Indication Text: Tumors in this location are included in Area M (cheek, forehead, scalp, neck, jawline and pretibial skin).  Mohs surgery is indicated for tumors in these anatomic locations. - Borderline SIRS criteria with his heart rate, had fevers as outpatient, 100F here  - Likely urinary source iso patient's history and data  - Monitor for further fevers  - Hypotensive on admission, resolved with IVF, on maintenance fluids  - Blood cx negative  - urine cx negative  - outpatient urine cx growing E Coli, sensitivities resulted   - C/W ceftriaxone for now given likely urinary source  - DC on Ciprofloxacin 7d  - S/P CT ap no signs of prostatitis

## 2024-01-26 NOTE — ED ADULT NURSE NOTE - CHIEF COMPLAINT QUOTE
Pt c/o general malaise, headache, hyperglycemia, polyuria and polydipsia, dizziness, chills x 5 days. Pt has pmh of DM, htn.  pt states he's compliant with his medications. pt noted to be  hypotensive. No complaints of chest pain,  nausea,  vomiting  SOB, fever, verbalized.. Performing Laboratory: 0 Expected Date Of Service: 01/26/2024 Bill For Surgical Tray: no Billing Type: Third-Party Bill

## 2024-09-05 ENCOUNTER — APPOINTMENT (OUTPATIENT)
Dept: OTHER | Facility: CLINIC | Age: 64
End: 2024-09-05
Payer: COMMERCIAL

## 2024-09-05 ENCOUNTER — LABORATORY RESULT (OUTPATIENT)
Age: 64
End: 2024-09-05

## 2024-09-05 VITALS
HEART RATE: 66 BPM | HEIGHT: 68 IN | SYSTOLIC BLOOD PRESSURE: 123 MMHG | TEMPERATURE: 98.3 F | DIASTOLIC BLOOD PRESSURE: 79 MMHG | RESPIRATION RATE: 15 BRPM | OXYGEN SATURATION: 98 % | WEIGHT: 152 LBS | BODY MASS INDEX: 23.04 KG/M2

## 2024-09-05 DIAGNOSIS — Z04.9 ENCOUNTER FOR EXAMINATION AND OBSERVATION FOR UNSPECIFIED REASON: ICD-10-CM

## 2024-09-05 PROCEDURE — 94010 BREATHING CAPACITY TEST: CPT

## 2024-09-05 PROCEDURE — 99396 PREV VISIT EST AGE 40-64: CPT | Mod: 25

## 2024-09-05 NOTE — DISCUSSION/SUMMARY
[Patient seen for WTC Monitoring ___] : Patient was seen for WTC monitoring [unfilled] [Please See Note in Chart and Documentation in Trial DB] : Please see note in chart and documentation in Trial DB. [FreeTextEntry3] : ID [id number].  HPI: 63 yo male presents for annual visit.   He reports to have a quadrable bypass  4/25/2024 after routine check up with cardiologist and stress test  Pt was seeing Dr. Tineo for microscopic hematuria - negative work up   He notes feeling sleepy during the day, unsure if he snores.   For his DM, his last A1c was 6.9% follows with  endocrinologist   PCP: Dr. Pedroza  [facility] Exposure Hx: Present at [facility] on 9/11/01 with [employer]. He was working with [employer] at the time, worked 3-4 nights.  Occ Hx:  for "[employer] working full time since 2003  PMH/PSH: - Thalassemia trait and chronic anemia, gout, DM, HTN, carotid stenosis ("30% blockage of R carotid"), microscopic hematuria on 2019 labs, elevated creatinine 2019, COVID + 7/2022 - L shoulder rotator cuff repair  Family History: sibling had HTN and CVA; father and brother with carotid artery stenosis  Preventive Screening: colonoscopy 11/2019, due back in 2025  Allergies: NKDA  Meds: see above  Soc Hx: 2 adult children  Smoking Status: never  Review of Systems: IAMQ reviewed with patient   Results: - CXR: ordered  - Spirometry: reviewed  A/P:  - CBC, CMP, lipids, UA ordered - CXR ordered  -PFT reviewed  - Follow up colonoscopy in 2025 - Advised to follow up with PCP regarding LESLEY evaluation - Flu shot will be getting with PMD  -RTC 1 year

## 2024-09-09 ENCOUNTER — APPOINTMENT (OUTPATIENT)
Dept: RADIOLOGY | Facility: CLINIC | Age: 64
End: 2024-09-09
Payer: COMMERCIAL

## 2024-09-09 LAB
ALBUMIN SERPL ELPH-MCNC: 4.7 G/DL
ALP BLD-CCNC: 75 U/L
ALT SERPL-CCNC: 19 U/L
ANION GAP SERPL CALC-SCNC: 17 MMOL/L
APPEARANCE: CLEAR
AST SERPL-CCNC: 25 U/L
BACTERIA: NEGATIVE /HPF
BASOPHILS # BLD AUTO: 0.06 K/UL
BASOPHILS NFR BLD AUTO: 0.9 %
BILIRUB SERPL-MCNC: 0.9 MG/DL
BILIRUBIN URINE: NEGATIVE
BLOOD URINE: NEGATIVE
BUN SERPL-MCNC: 27 MG/DL
CALCIUM SERPL-MCNC: 9.5 MG/DL
CAST: 0 /LPF
CHLORIDE SERPL-SCNC: 102 MMOL/L
CHOLEST SERPL-MCNC: 123 MG/DL
CO2 SERPL-SCNC: 21 MMOL/L
COLOR: YELLOW
CREAT SERPL-MCNC: 1.27 MG/DL
EGFR: 64 ML/MIN/1.73M2
EOSINOPHIL # BLD AUTO: 0.16 K/UL
EOSINOPHIL NFR BLD AUTO: 2.6 %
EPITHELIAL CELLS: 0 /HPF
GLUCOSE QUALITATIVE U: >=1000 MG/DL
GLUCOSE SERPL-MCNC: 114 MG/DL
HCT VFR BLD CALC: 38.7 %
HDLC SERPL-MCNC: 48 MG/DL
HGB BLD-MCNC: 11.2 G/DL
KETONES URINE: NEGATIVE MG/DL
LDLC SERPL CALC-MCNC: 59 MG/DL
LEUKOCYTE ESTERASE URINE: NEGATIVE
LYMPHOCYTES # BLD AUTO: 2.32 K/UL
LYMPHOCYTES NFR BLD AUTO: 36.8 %
MAN DIFF?: NORMAL
MCHC RBC-ENTMCNC: 18.2 PG
MCHC RBC-ENTMCNC: 28.9 GM/DL
MCV RBC AUTO: 62.7 FL
MICROSCOPIC-UA: NORMAL
MONOCYTES # BLD AUTO: 0.28 K/UL
MONOCYTES NFR BLD AUTO: 4.4 %
NEUTROPHILS # BLD AUTO: 3.49 K/UL
NEUTROPHILS NFR BLD AUTO: 55.3 %
NITRITE URINE: NEGATIVE
NONHDLC SERPL-MCNC: 75 MG/DL
PH URINE: 5.5
PLATELET # BLD AUTO: 277 K/UL
POTASSIUM SERPL-SCNC: 4.8 MMOL/L
PROT SERPL-MCNC: 7 G/DL
PROTEIN URINE: NEGATIVE MG/DL
RBC # BLD: 6.17 M/UL
RBC # FLD: 20.4 %
RED BLOOD CELLS URINE: 0 /HPF
SODIUM SERPL-SCNC: 139 MMOL/L
SPECIFIC GRAVITY URINE: 1.02
TRIGL SERPL-MCNC: 81 MG/DL
UROBILINOGEN URINE: 0.2 MG/DL
WBC # FLD AUTO: 6.31 K/UL
WHITE BLOOD CELLS URINE: 0 /HPF

## 2024-09-09 PROCEDURE — 71046 X-RAY EXAM CHEST 2 VIEWS: CPT

## 2025-08-21 ENCOUNTER — APPOINTMENT (OUTPATIENT)
Dept: OTHER | Facility: CLINIC | Age: 65
End: 2025-08-21
Payer: COMMERCIAL

## 2025-08-21 VITALS
WEIGHT: 158 LBS | HEIGHT: 68 IN | OXYGEN SATURATION: 95 % | DIASTOLIC BLOOD PRESSURE: 72 MMHG | SYSTOLIC BLOOD PRESSURE: 115 MMHG | BODY MASS INDEX: 23.95 KG/M2 | HEART RATE: 62 BPM | RESPIRATION RATE: 16 BRPM | TEMPERATURE: 97.9 F

## 2025-08-21 DIAGNOSIS — Z04.9 ENCOUNTER FOR EXAMINATION AND OBSERVATION FOR UNSPECIFIED REASON: ICD-10-CM

## 2025-08-21 PROCEDURE — 99396 PREV VISIT EST AGE 40-64: CPT | Mod: 25
